# Patient Record
Sex: MALE | Race: WHITE | Employment: FULL TIME | URBAN - METROPOLITAN AREA
[De-identification: names, ages, dates, MRNs, and addresses within clinical notes are randomized per-mention and may not be internally consistent; named-entity substitution may affect disease eponyms.]

---

## 2018-09-20 ENCOUNTER — HOSPITAL ENCOUNTER (OUTPATIENT)
Dept: NUCLEAR MEDICINE | Age: 33
Discharge: HOME | End: 2018-09-20
Attending: INTERNAL MEDICINE
Payer: COMMERCIAL

## 2018-09-20 VITALS — WEIGHT: 175 LBS

## 2018-09-20 DIAGNOSIS — R10.13 EPIGASTRIC PAIN: ICD-10-CM

## 2018-09-20 DIAGNOSIS — R10.11 RIGHT UPPER QUADRANT PAIN: ICD-10-CM

## 2018-09-20 PROCEDURE — 74011000258 HC RX REV CODE- 258: Performed by: INTERNAL MEDICINE

## 2018-09-20 PROCEDURE — 78227 HEPATOBIL SYST IMAGE W/DRUG: CPT

## 2018-09-20 PROCEDURE — A9537 TC99M MEBROFENIN: HCPCS

## 2018-09-20 PROCEDURE — 74011250636 HC RX REV CODE- 250/636: Performed by: INTERNAL MEDICINE

## 2018-09-20 RX ORDER — SODIUM CHLORIDE 0.9 % (FLUSH) 0.9 %
10 SYRINGE (ML) INJECTION
Status: COMPLETED | OUTPATIENT
Start: 2018-09-20 | End: 2018-09-20

## 2018-09-20 RX ORDER — SODIUM CHLORIDE 9 MG/ML
25 INJECTION, SOLUTION INTRAVENOUS
Status: COMPLETED | OUTPATIENT
Start: 2018-09-20 | End: 2018-09-20

## 2018-09-20 RX ADMIN — SINCALIDE 1.59 MCG: 5 INJECTION, POWDER, LYOPHILIZED, FOR SOLUTION INTRAVENOUS at 12:00

## 2018-09-20 RX ADMIN — Medication 10 ML: at 10:54

## 2018-09-20 RX ADMIN — SODIUM CHLORIDE 25 ML: 9 INJECTION, SOLUTION INTRAVENOUS at 12:00

## 2021-03-04 ENCOUNTER — OFFICE VISIT (OUTPATIENT)
Dept: NEUROLOGY | Age: 36
End: 2021-03-04
Payer: COMMERCIAL

## 2021-03-04 VITALS
TEMPERATURE: 97 F | SYSTOLIC BLOOD PRESSURE: 136 MMHG | OXYGEN SATURATION: 98 % | HEART RATE: 90 BPM | DIASTOLIC BLOOD PRESSURE: 84 MMHG | BODY MASS INDEX: 27 KG/M2 | HEIGHT: 67 IN | RESPIRATION RATE: 14 BRPM | WEIGHT: 172 LBS

## 2021-03-04 DIAGNOSIS — R20.0 NUMBNESS AND TINGLING IN BOTH HANDS: ICD-10-CM

## 2021-03-04 DIAGNOSIS — R20.2 NUMBNESS AND TINGLING IN BOTH HANDS: ICD-10-CM

## 2021-03-04 DIAGNOSIS — M54.2 NECK PAIN: ICD-10-CM

## 2021-03-04 DIAGNOSIS — G43.719 INTRACTABLE CHRONIC MIGRAINE WITHOUT AURA AND WITHOUT STATUS MIGRAINOSUS: Primary | ICD-10-CM

## 2021-03-04 PROCEDURE — 99244 OFF/OP CNSLTJ NEW/EST MOD 40: CPT | Performed by: PSYCHIATRY & NEUROLOGY

## 2021-03-04 RX ORDER — HYDROGEN PEROXIDE 3 %
20 SOLUTION, NON-ORAL MISCELLANEOUS DAILY
COMMUNITY
End: 2022-03-16 | Stop reason: ALTCHOICE

## 2021-03-04 RX ORDER — RIMEGEPANT SULFATE 75 MG/75MG
TABLET, ORALLY DISINTEGRATING ORAL
Qty: 8 TAB | Refills: 3 | Status: SHIPPED | OUTPATIENT
Start: 2021-03-04 | End: 2021-06-03 | Stop reason: SDUPTHER

## 2021-03-04 RX ORDER — LANOLIN ALCOHOL/MO/W.PET/CERES
1000 CREAM (GRAM) TOPICAL DAILY
COMMUNITY

## 2021-03-04 RX ORDER — FREMANEZUMAB-VFRM 225 MG/1.5ML
1.5 INJECTION SUBCUTANEOUS
Qty: 1.5 ML | Refills: 3 | Status: SHIPPED | OUTPATIENT
Start: 2021-03-04 | End: 2021-06-03 | Stop reason: SDUPTHER

## 2021-03-04 RX ORDER — BUTALBITAL, ACETAMINOPHEN AND CAFFEINE 300; 40; 50 MG/1; MG/1; MG/1
CAPSULE ORAL
COMMUNITY
End: 2022-10-17

## 2021-03-04 RX ORDER — FREMANEZUMAB-VFRM 225 MG/1.5ML
225 INJECTION SUBCUTANEOUS ONCE
Qty: 1.5 ML | Refills: 0 | Status: SHIPPED | COMMUNITY
Start: 2021-03-04 | End: 2021-03-04

## 2021-03-04 NOTE — PROGRESS NOTES
Chief Complaint   Patient presents with    New Patient    Headache     on and off since 2018    Tingling     Was seeing neuro Dr. Georgie Diaz in 2018, tried nortriptyline, amitriptyline only gave slight improvement. >15/30 HA days. May be in front of head, back of head, behind eyes. Will get occasional tingling in arms and hands. Mainly right side. Does not lose strength. Does get nausea. Was seen at [facility] ED.  mid Feb 2021 had CT head.   Will have random eye twitching  Starting late Jan.  Eye doctor did not see anything abnl

## 2021-03-04 NOTE — PROGRESS NOTES
[facility] and [facility] at [facility] Neurology Clinics at [address] [place] [address] [place] [address] [facility] [address] [place] [address] [phone] Office  [phone] Facsimile           Referring: Ray Gonzáles MD      Chief Complaint   Patient presents with   • New Patient   • Headache     on and off since 2018   • Tingling     35-year-old right-handed gentleman presents for neurologic consultation regarding ongoing headaches and neck pain as well as hand tingling.  He tells me he began having headaches in 2018.  He was seeing Dr. Castaneda.  He tried Fioricet for abortive therapy.  Elavil and Pamelor for preventative therapy.  Did not really have much result with that.  He tried acupuncture in 2019.  Headaches are still ongoing.  In an average month he has between 10-20 averaging at least 15/month.  Average duration is 4 hours but can last up to 6 hours or more.  Fioricet will help reduce the intensity but does not take it away.  He does not have an aura.  He has associated nausea but no vomiting.  He notes that typically they are more right sided but does have neck pain as well.  Bilateral hands have numbness and tingling.  This can happen with or without a headache.  Father and uncle have sick headaches.  He also was having some twitching of his right eye.  He has seen the eye doctor and everything looked good.    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  Subjective visual disturbance in August 3, 2020 as well as the glaucomatous atrophy of the optic disc and nystagmus.    [facility] visits to 11/21 and 12/14/2020 for Covid testing both of which were not detected.  Past Medical History:   Diagnosis Date   • GERD (gastroesophageal reflux disease)    • Headache        Past  Surgical History:   Procedure Laterality Date    HX ORTHOPAEDIC  2008    both knees for tendon repairs     Current Outpatient Medications   Medication Sig Dispense Refill    esomeprazole (NEXIUM) 20 mg capsule Take 20 mg by mouth daily.  MEN'S MULTI-VITAMIN PO Take  by mouth.  fish oil-omega-3 fatty acids (Fish Oil) 340-1,000 mg capsule Take 1 Cap by mouth daily.  cyanocobalamin 1,000 mcg tablet Take 1,000 mcg by mouth daily.  butalbital-acetaminophen-caff (Fioricet) -40 mg per capsule Take  by mouth every four (4) hours as needed for Headache. Not on File Naprosyn and Pediazole noted to be allergies    Social History     Tobacco Use    Smoking status: Never Smoker    Smokeless tobacco: Never Used   Substance Use Topics    Alcohol use: Not Currently    Drug use: Not Currently       Family History   Problem Relation Age of Onset    Cancer Mother         cervical    High Cholesterol Father     Hypertension Father     Headache Father     Stroke Paternal Grandfather      Examination  Visit Vitals  /84 (BP 1 Location: Left upper arm, BP Patient Position: Sitting, BP Cuff Size: Adult)   Pulse 90   Temp 97 °F (36.1 °C)   Resp 14   Ht 5' 7\" (1.702 m)   Wt 78 kg (172 lb)   SpO2 98%   BMI 26.94 kg/m²     Neurologically, he is awake, alert, oriented with normal speech, language, and cognition. Cranial nerves intact 2-12. He has normal bulk and tone. There is no abnormal movement. There is no pronation or drift. He is able to generate full strength in the upper and lower extremities and all muscle groups to direct confrontational testing. Reflexes are symmetrical in the upper and lower extremities bilaterally. No pathologic reflexes are elicited. He has no ataxia or past pointing.   Tinel's sign present bilateral wrists right greater than left    Impression/Plan  Chronic intractable migraine headache without aura  Discussed options  Discussed migraine pathophysiology in the context of CGRP  Start Ajovy customary fashion first injection in the office today and discussed studies, expectations, side effect potential specifically injection site redness  For abortive therapy Nurtec 1 at migraine onset and also discussed potential side effects which really are no systemic side effects etc.  Track headaches on a calendar  Continue this regimen x3 months and return for decision regarding any changes in management    Numbness and tingling in the bilateral hands right greater than left with Tinel's sign likely carpal tunnel however need to exclude cervical process as well given the neck pain  EMG of the bilateral upper extremities    Follow in 3 months and if the EMG reveals something about which we need to discuss we will call    Marny Hamman, MD        This note was created using voice recognition software. Despite editing, there may be syntax errors.

## 2021-03-17 ENCOUNTER — OFFICE VISIT (OUTPATIENT)
Dept: NEUROLOGY | Age: 36
End: 2021-03-17
Payer: COMMERCIAL

## 2021-03-17 DIAGNOSIS — R20.2 NUMBNESS AND TINGLING IN BOTH HANDS: Primary | ICD-10-CM

## 2021-03-17 DIAGNOSIS — R20.0 NUMBNESS AND TINGLING IN BOTH HANDS: Primary | ICD-10-CM

## 2021-03-17 PROCEDURE — 95911 NRV CNDJ TEST 9-10 STUDIES: CPT | Performed by: PSYCHIATRY & NEUROLOGY

## 2021-03-17 PROCEDURE — 95886 MUSC TEST DONE W/N TEST COMP: CPT | Performed by: PSYCHIATRY & NEUROLOGY

## 2021-03-17 NOTE — PROCEDURES
EMG/ NCS Report  [facility] - INPATIENT  [address] [facility], [address]   Ph: [phone]   FAX: [phone]/ [phone]      Test Date:  3/17/2021    Patient: Madelin Richter : 1985 Physician: Anthony Baird MD   ID#: [id number] SEX: Male Ref. Phys: India Lopez MD   Tech: [employer]    Patient History / Exam:  Patient complaining of neck pain and intermittent bilateral hand numbness and tingling for months. Exam is non-focal. Assess for neuropathy vs cervical radiculopathy. EMG & NCV Findings:  Sensory and motor nerve conduction studies (as indicated in the tables) were within reference of normal. All left vs. right side differences were within normal limits. All F Wave latencies were within normal limits. All F Wave left vs. right side latency differences were within normal limits. Disposable concentric needle EMG (as indicated in the table) showed no evidence of electrical instability. Impressions: This study is normal.  There is no electrodiagnostic evidence of an entrapment neuropathy, generalized neuropathy, myopathy or significant cervical radiculopathy at this time. Thank you for the consult.      Seamus Temple MD    Nerve Conduction Studies  Anti Sensory Summary Table     Stim Site NR Peak (ms) Norm Peak (ms) P-T Amp (µV) Norm P-T Amp Site1 Site2 Dist (cm)   Left Median Anti Sensory (2nd Digit)  29.6°C   Wrist    3.2 <4 66.2 >13 Wrist 2nd Digit 14.0   Right Median Anti Sensory (2nd Digit)  29.8°C   Wrist    3.4 <4 20.7 >13 Wrist 2nd Digit 14.0   Elbow    3.4  29.9  Elbow Wrist 0.0   Left Radial Anti Sensory (Base 1st Digit)  29°C   Wrist    2.0 <2.8 44.5 >11 Wrist Base 1st Digit 10.0   Right Radial Anti Sensory (Base 1st Digit)  29.7°C   Wrist    1.8 <2.8 52.6 >11 Wrist Base 1st Digit 10.0   Left Ulnar Anti Sensory (5th Digit)  28.9°C   Wrist    3.6 <4.0 47.7 >9 Wrist 5th Digit 14.0   Right Ulnar Anti Sensory (5th Digit)  29.6°C   Wrist    3.1 <4.0 51.2 >9 Wrist 5th Digit 14.0     Motor Summary Table     Stim Site NR Onset (ms) Norm Onset (ms) O-P Amp (mV) Norm O-P Amp Amp (Prev) (%) Site1 Site2 Dist (cm) Peter (m/s) Norm Peter (m/s)   Left Median Motor (Abd Poll Brev)  29.1°C   Wrist    3.0 <4.5 10.7 >4.1 100.0 Wrist Abd Poll Brev 8.0     Elbow    6.2  10.1  94.4 Elbow Wrist 17.5 55 >49   Right Median Motor (Abd Poll Brev)  29.5°C   Wrist    3.4 <4.5 13.0 >4.1 100.0 Wrist Abd Poll Brev 8.0     Elbow    6.6  12.3  94.6 Elbow Wrist 18.5 58 >49   Left Ulnar Motor (Abd Dig Minimi)  29.4°C   Wrist    3.0 <3.1 15.7 >7.0 100.0 Wrist Abd Dig Minimi 8.0  >50   B Elbow    5.5  14.1  89.8 B Elbow Wrist 17.0 68 >50   A Elbow    7.1  15.1  107.1 A Elbow B Elbow 10.0 63 >50   Right Ulnar Motor (Abd Dig Minimi)  29.5°C   Wrist    3.0 <3.1 13.2 >7.0 100.0 Wrist Abd Dig Minimi 8.0  >50   B Elbow    5.5  12.6  95.5 B Elbow Wrist 13.0 52 >50   A Elbow    7.4  12.1  96.0 A Elbow B Elbow 10.0 53 >50     Comparison Summary Table     Stim Site NR Peak (ms) P-T Amp (µV) Site1 Site2 Dist (cm) Delta-0 (ms)   Left Median/Ulnar Palm Comparison (Wrist)  28.9°C   Median Palm    1.7 68.3 Median Palm Ulnar Palm 8.0 0.0   Ulnar Palm    1.8 32.5       Right Median/Ulnar Palm Comparison (Wrist)  29.5°C   Median Palm    2.2 45.7 Median Palm Ulnar Palm 8.0 0.0   Ulnar Palm    2.0 24.8         F Wave Studies     NR F-Lat (ms) Lat Norm (ms) L-R F-Lat (ms) L-R Lat Norm   Left Ulnar (Mrkrs) (Abd Dig Min)  29.3°C      22.75 <32 1.47 <2.5   Right Ulnar (Mrkrs) (Abd Dig Min)  29.6°C      24.22 <32 1.47 <2.5       EMG     Side Muscle Nerve Root Ins Act Fibs Psw Recrt Duration Amp Poly Comment   Left Deltoid Axillary C5-6 Nml Nml Nml Nml Nml Nml Nml    Left Triceps Radial C6-7-8 Nml Nml Nml Nml Nml Nml Nml    Left Biceps Musculocut C5-6 Nml Nml Nml Nml Nml Nml Nml    Left PronatorTeres Median C6-7 Nml Nml Nml Nml Nml Nml Nml    Left 1stDorInt Ulnar C8-T1 Nml Nml Nml Nml Nml Nml Nml    Left Lower Cerv Parasp Rami C7,T1 Nml Nml Nml Nml Nml Nml Nml    Left Mid Cerv Parasp Rami C5,6 Nml Nml Nml Nml Nml Nml Nml    Right Deltoid Axillary C5-6 Nml Nml Nml Nml Nml Nml Nml    Right Triceps Radial C6-7-8 Nml Nml Nml Nml Nml Nml Nml    Right Biceps Musculocut C5-6 Nml Nml Nml Nml Nml Nml Nml    Right PronatorTeres Median C6-7 Nml Nml Nml Nml Nml Nml Nml    Right 1stDorInt Ulnar C8-T1 Nml Nml Nml Nml Nml Nml Nml    Right Lower Cerv Parasp Rami C7,T1 Nml Nml Nml Nml Nml Nml Nml    Right Mid Cerv Parasp Rami C5,6 Nml Nml Nml Nml Nml Nml Nml      Waveforms:

## 2021-03-29 ENCOUNTER — TELEPHONE (OUTPATIENT)
Dept: NEUROLOGY | Age: 36
End: 2021-03-29

## 2021-03-29 NOTE — TELEPHONE ENCOUNTER
----- Message from Angela Cosby Page sent at 3/29/2021  9:34 AM EDT -----  Regarding: Dr. Kinjal Pina Message/Vendor Calls    Caller's first and last name: Patient       Reason for call: Medication interaction questions      Callback required yes/no and why: Yes.  To advise       Best contact number(s):  [phone]      Details to clarify the request:  Patient's pharmacy has advise d him to consults with Dr. Venkatesh Ramírez in regards to drug interaction of medications Ajovy, Nurtec and Chlorythimicin ( prescribed by ENT provider)      Suzanna Yu

## 2021-06-03 ENCOUNTER — OFFICE VISIT (OUTPATIENT)
Dept: NEUROLOGY | Age: 36
End: 2021-06-03
Payer: COMMERCIAL

## 2021-06-03 VITALS
DIASTOLIC BLOOD PRESSURE: 80 MMHG | HEIGHT: 67 IN | BODY MASS INDEX: 24.86 KG/M2 | SYSTOLIC BLOOD PRESSURE: 118 MMHG | OXYGEN SATURATION: 99 % | RESPIRATION RATE: 18 BRPM | HEART RATE: 111 BPM | WEIGHT: 158.4 LBS

## 2021-06-03 DIAGNOSIS — G43.719 INTRACTABLE CHRONIC MIGRAINE WITHOUT AURA AND WITHOUT STATUS MIGRAINOSUS: Primary | ICD-10-CM

## 2021-06-03 PROCEDURE — 99213 OFFICE O/P EST LOW 20 MIN: CPT | Performed by: PSYCHIATRY & NEUROLOGY

## 2021-06-03 RX ORDER — TIZANIDINE HYDROCHLORIDE 2 MG/1
2 CAPSULE, GELATIN COATED ORAL 3 TIMES DAILY
COMMUNITY

## 2021-06-03 RX ORDER — RIMEGEPANT SULFATE 75 MG/75MG
TABLET, ORALLY DISINTEGRATING ORAL
Qty: 8 TABLET | Refills: 11 | Status: SHIPPED | OUTPATIENT
Start: 2021-06-03 | End: 2022-08-22

## 2021-06-03 RX ORDER — FREMANEZUMAB-VFRM 225 MG/1.5ML
225 INJECTION SUBCUTANEOUS
Qty: 225 MG | Refills: 11 | Status: SHIPPED | OUTPATIENT
Start: 2021-06-03 | End: 2022-10-17 | Stop reason: SDUPTHER

## 2021-06-03 NOTE — PROGRESS NOTES
[address] [facility] Neurology Clinics and [address] at [facility] Neurology Clinics at [address] [facility], [address] [place] [address] [address] [facility], [address] [place] [address]   [phone]              Chief Complaint   Patient presents with    Migraine     Current Outpatient Medications   Medication Sig Dispense Refill    tiZANidine (ZANAFLEX) 2 mg capsule Take 2 mg by mouth three (3) times daily.  fremanezumab-vfrm (Ajovy Autoinjector) 225 mg/1.5 mL auto-injector 1.5 mL by SubCUTAneous route every month. 225 mg 11    rimegepant (Nurtec ODT) 75 mg disintegrating tablet 1 at migraine onset  Max 1 tab/24 hours 8 Tablet 11    esomeprazole (NEXIUM) 20 mg capsule Take 20 mg by mouth daily.  MEN'S MULTI-VITAMIN PO Take  by mouth.  cyanocobalamin 1,000 mcg tablet Take 1,000 mcg by mouth daily.  butalbital-acetaminophen-caff (Fioricet) -40 mg per capsule Take  by mouth every four (4) hours as needed for Headache. Not on File  Social History     Tobacco Use    Smoking status: Never Smoker    Smokeless tobacco: Never Used   Substance Use Topics    Alcohol use: Not Currently    Drug use: Not Currently     54-year-old gentleman returns today for follow-up intractable migraine headache. Last visit we started him on Ajovy and Nurtec. He was having some numbness and tingling in his hands and he went for an EMG which was normal.    Today he reports significant decrease in his migraines. He was having about 10/month and he had 3 or 4 in March 2 in April 1 in May and none in June thus far. He has use Nurtec x4 and got great response. Happy with how he is doing. He has some questions today regarding migraine, headaches etc. and we discussed those today good questions.     Examination  Visit Vitals  /80   Pulse (!) 111   Resp 18   Ht 5' 7\" (1.702 m)   Wt 71.8 kg (158 lb 6.4 oz)   SpO2 99%   BMI 24.81 kg/m²     Pleasant gentleman who is awake alert oriented conversant. Normal speech and lines. Normal cognition. No ataxia    Impression/Plan  Intractable migraines with good initial response to Ajovy and Nurtec. As long as he does well follow in the fall    Chavo Arceo MD        This note was created using voice recognition software. Despite editing, there may be syntax errors.

## 2021-06-03 NOTE — PROGRESS NOTES
Mr. Richard Manzano presents today to follow up migraines. Patient reported a decrease in migraines. Prior to starting Ajovy he was having approximately ten migraines per month. In March he had 3-4, in April he had 2, in May he had 1 and thus far in June, he has no migraine. Depression screening done on this patient.

## 2021-06-03 NOTE — PATIENT INSTRUCTIONS
[address] [facility] Neurology Clinic   Statement to Patients  April 1, 2014      In an effort to ensure the large volume of patient prescription refills is processed in the most efficient and expeditious manner, we are asking our patients to assist us by calling your Pharmacy for all prescription refills, this will include also your  Mail Order Pharmacy. The pharmacy will contact our office electronically to continue the refill process. Please do not wait until the last minute to call your pharmacy. We need at least 48 hours (2days) to fill prescriptions. We also encourage you to call your pharmacy before going to  your prescription to make sure it is ready. With regard to controlled substance prescription refill requests (narcotic refills) that need to be picked up at our office, we ask your cooperation by providing us with at least 72 hours (3days) notice that you will need a refill. We will not refill narcotic prescription refill requests after 4:00pm on any weekday, Monday through Thursday, or after 2:00pm on Fridays, or on the weekends. We encourage everyone to explore another way of getting your prescription refill request processed using [employer], our patient web portal through our electronic medical record system. [employer] is an efficient and effective way to communicate your medication request directly to the office and  downloadable as an bernice on your smart phone . [employer] also features a review functionality that allows you to view your medication list as well as leave messages for your physician. Are you ready to get connected? If so please review the attatched instructions or speak to any of our staff to get you set up right away! Thank you so much for your cooperation. Should you have any questions please contact our Practice Administrator.     The Physicians and Staff,  [facility]

## 2022-02-27 ENCOUNTER — TELEPHONE (OUTPATIENT)
Dept: NEUROLOGY | Age: 37
End: 2022-02-27

## 2022-02-27 NOTE — TELEPHONE ENCOUNTER
Hakeem BROWN initiated through Cover my meds key # [id number]    Approvedtoday  AZIJYU:[id number];LSEIUT:[id number]; Review Type:Prior Auth; Coverage Start Date:01/28/2022; Coverage End Date:02/27/2023

## 2022-03-16 ENCOUNTER — OFFICE VISIT (OUTPATIENT)
Dept: NEUROLOGY | Age: 37
End: 2022-03-16
Payer: COMMERCIAL

## 2022-03-16 VITALS
BODY MASS INDEX: 24.75 KG/M2 | TEMPERATURE: 99.1 F | SYSTOLIC BLOOD PRESSURE: 127 MMHG | HEART RATE: 80 BPM | RESPIRATION RATE: 16 BRPM | WEIGHT: 158 LBS | OXYGEN SATURATION: 99 % | DIASTOLIC BLOOD PRESSURE: 70 MMHG

## 2022-03-16 DIAGNOSIS — H53.9 VISUAL DISTURBANCE: Primary | ICD-10-CM

## 2022-03-16 DIAGNOSIS — R20.0 RIGHT ARM NUMBNESS: ICD-10-CM

## 2022-03-16 PROCEDURE — 99214 OFFICE O/P EST MOD 30 MIN: CPT | Performed by: PSYCHIATRY & NEUROLOGY

## 2022-03-16 NOTE — PROGRESS NOTES
[facility] Neurology Clinics and [address] at [facility] Neurology Clinics at [address] [facility], [address] [facility] [address] [facility], [address] [place] [address]   [phone]              Chief Complaint   Patient presents with    Migraine     much better, will still have \"odd sensations\" in top of head and tingling down right arm // last Ajovy was 3/6/22     Current Outpatient Medications   Medication Sig Dispense Refill    tiZANidine (ZANAFLEX) 2 mg capsule Take 2 mg by mouth three (3) times daily.  fremanezumab-vfrm (Ajovy Autoinjector) 225 mg/1.5 mL auto-injector 1.5 mL by SubCUTAneous route every month. 225 mg 11    rimegepant (Nurtec ODT) 75 mg disintegrating tablet 1 at migraine onset  Max 1 tab/24 hours 8 Tablet 11    MEN'S MULTI-VITAMIN PO Take  by mouth.  cyanocobalamin 1,000 mcg tablet Take 1,000 mcg by mouth daily.  butalbital-acetaminophen-caff (Fioricet) -40 mg per capsule Take  by mouth every four (4) hours as needed for Headache. No Known Allergies  Social History     Tobacco Use    Smoking status: Never Smoker    Smokeless tobacco: Never Used   Substance Use Topics    Alcohol use: Not Currently    Drug use: Not Currently     42-year-old gentleman returns today for follow-up. He was last seen June of last year for migraine. He has been maintained on Ajovy and Nurtec. He also has abnormal sensation in his upper extremities and had an EMG that was done by one of our neuromuscular experts and that was normal.    Today he reports migraines are better. Tolerating medicines. Having odd sensations in the top of his head tingling down to his arm. Feels weak at times. Has visual change as well.     Examination  Visit Vitals  /70 (BP 1 Location: Left upper arm, BP Patient Position: Sitting, BP Cuff Size: Adult)   Pulse 80   Temp 99.1 °F (37.3 °C)   Resp 16   Wt 71.7 kg (158 lb)   SpO2 99% BMI 24.75 kg/m²     Awake alert and oriented. Normal speech and lines were normal cognition. No cranial nerve deficit. No pronation or drift. No ataxia    Impression/Plan  Migraines doing well  Continue with Ajovy    New symptoms of sensory disturbance and visual change  MRI of the brain  Carotid Doppler    Follow-up after testing      Semaj Frost MD        This note was created using voice recognition software. Despite editing, there may be syntax errors.

## 2022-03-22 ENCOUNTER — HOSPITAL ENCOUNTER (OUTPATIENT)
Dept: MRI IMAGING | Age: 37
Discharge: HOME | End: 2022-03-22
Attending: PSYCHIATRY & NEUROLOGY
Payer: COMMERCIAL

## 2022-03-22 VITALS — BODY MASS INDEX: 26.63 KG/M2 | WEIGHT: 170 LBS

## 2022-03-22 DIAGNOSIS — H53.9 VISUAL DISTURBANCE: ICD-10-CM

## 2022-03-22 DIAGNOSIS — R20.0 RIGHT ARM NUMBNESS: ICD-10-CM

## 2022-03-22 PROCEDURE — 70553 MRI BRAIN STEM W/O & W/DYE: CPT

## 2022-03-22 PROCEDURE — 74011250636 HC RX REV CODE- 250/636: Performed by: PSYCHIATRY & NEUROLOGY

## 2022-03-22 PROCEDURE — A9576 INJ PROHANCE MULTIPACK: HCPCS | Performed by: PSYCHIATRY & NEUROLOGY

## 2022-03-22 RX ADMIN — GADOTERIDOL 15 ML: 279.3 INJECTION, SOLUTION INTRAVENOUS at 08:01

## 2022-08-22 RX ORDER — RIMEGEPANT SULFATE 75 MG/75MG
TABLET, ORALLY DISINTEGRATING ORAL
Qty: 8 TABLET | Refills: 11 | Status: SHIPPED | OUTPATIENT
Start: 2022-08-22 | End: 2022-10-17 | Stop reason: SDUPTHER

## 2022-10-17 ENCOUNTER — OFFICE VISIT (OUTPATIENT)
Dept: NEUROLOGY | Age: 37
End: 2022-10-17
Payer: COMMERCIAL

## 2022-10-17 VITALS
DIASTOLIC BLOOD PRESSURE: 75 MMHG | WEIGHT: 162.3 LBS | HEART RATE: 73 BPM | SYSTOLIC BLOOD PRESSURE: 126 MMHG | RESPIRATION RATE: 16 BRPM | BODY MASS INDEX: 25.42 KG/M2 | OXYGEN SATURATION: 99 %

## 2022-10-17 DIAGNOSIS — H53.9 VISUAL DISTURBANCE: ICD-10-CM

## 2022-10-17 DIAGNOSIS — G43.719 INTRACTABLE CHRONIC MIGRAINE WITHOUT AURA AND WITHOUT STATUS MIGRAINOSUS: Primary | ICD-10-CM

## 2022-10-17 PROCEDURE — 99214 OFFICE O/P EST MOD 30 MIN: CPT | Performed by: PSYCHIATRY & NEUROLOGY

## 2022-10-17 RX ORDER — RIMEGEPANT SULFATE 75 MG/75MG
TABLET, ORALLY DISINTEGRATING ORAL
Qty: 8 TABLET | Refills: 11 | Status: SHIPPED | OUTPATIENT
Start: 2022-10-17

## 2022-10-17 RX ORDER — FREMANEZUMAB-VFRM 225 MG/1.5ML
225 INJECTION SUBCUTANEOUS
Qty: 1.5 ML | Refills: 11 | Status: SHIPPED | OUTPATIENT
Start: 2022-10-17

## 2022-10-17 NOTE — PROGRESS NOTES
[facility] and [address] at [facility] Neurology Clinics at 42 [facility], [address] [facility] [address] [facility], [address] [place] [address]   [phone]              Chief Complaint   Patient presents with    Migraine     Doing very well with Ajovy, last dose was 10/1. Maybe slight flare up approx 2 per mo, previously was approx 4 per week. Nurtec works well to abort HA     Current Outpatient Medications   Medication Sig Dispense Refill    rimegepant (Nurtec ODT) 75 mg disintegrating tablet DISSOLVE 1 TABLET ON THE TONGUE AT ONSET OF MIGRAINE. MAX 1 TABLET/ 24 HOURS 8 Tablet 11    tiZANidine (ZANAFLEX) 2 mg capsule Take 2 mg by mouth three (3) times daily. fremanezumab-vfrm (Ajovy Autoinjector) 225 mg/1.5 mL auto-injector 1.5 mL by SubCUTAneous route every month. 225 mg 11    MEN'S MULTI-VITAMIN PO Take  by mouth.      cyanocobalamin 1,000 mcg tablet Take 1,000 mcg by mouth daily. No Known Allergies  Social History     Tobacco Use    Smoking status: Never    Smokeless tobacco: Never   Substance Use Topics    Alcohol use: Yes     Alcohol/week: 3.0 standard drinks     Types: 3 Cans of beer per week    Drug use: Never     74-year-old gentleman who returns today for follow-up intractable migraine headaches. Last visit with me was in March and at that time he was having a new issue of visual disturbance and some dysesthesia and we sent him for a couple of tests  MRI of the brain personally reviewed and unremarkable  Carotid Doppler unremarkable  Today he reports she is doing well in terms of his migraine. He tolerates Ajovy without difficulty. He was having for migraines per week and with the Ajovy he has about 2/month. Nurtec works well to abort the headache. He is really happy about that and happy with how he is doing. No injection site reactions.   He saw his MRI and Doppler results and was happy about that.  He continues to see the retina specialist at the [facility]. Examination  Visit Vitals  /75   Pulse 73   Resp 16   Wt 73.6 kg (162 lb 4.8 oz)   SpO2 99%   BMI 25.42 kg/m²   Very pleasant gentleman. He is awake alert and oriented. He has normal speech language and cognition. No ataxia      Impression/Plan  Intractable migraines markedly better with Ajovy and Nurtec  Continue this regimen    Visual disturbance with normal MRI and Doppler  Continue with the [facility] [place] [facility]    Follow-up as needed    Pawan Wilkerson MD        This note was created using voice recognition software. Despite editing, there may be syntax errors.

## 2023-02-22 ENCOUNTER — PATIENT MESSAGE (OUTPATIENT)
Dept: NEUROLOGY | Age: 38
End: 2023-02-22

## 2023-02-22 DIAGNOSIS — G43.719 INTRACTABLE CHRONIC MIGRAINE WITHOUT AURA AND WITHOUT STATUS MIGRAINOSUS: Primary | ICD-10-CM

## 2023-02-23 RX ORDER — METHYLPREDNISOLONE 4 MG/1
TABLET ORAL
Qty: 1 DOSE PACK | Refills: 0 | Status: SHIPPED | OUTPATIENT
Start: 2023-02-23 | End: 2023-02-28

## 2023-02-23 NOTE — TELEPHONE ENCOUNTER
Toñito Simpson 2/23/2023 9:05 AM EST      ----- Message -----  From: Stacy Javed  Sent: 2/22/2023 1:36 PM EST  To: [facility] Nurse Pool  Subject: Nurtec pre authorization     Good afternoon Dr Isma Rich belated new year. Trying to get my Nurtec RX refilled and it keeps getting denied despite having a valid [facility] [facility] and my same insurance Ive been using. Apparently express scripts requires a coverage review from the doctor now (fair enough I suppose as it retails for ~$900/8 tabs). The review # is [id number]. If you could help with this Id be very appreciative.     Thank you :)    Melia Carter

## 2023-02-23 NOTE — TELEPHONE ENCOUNTER
Nurtec PA initiated through Cover my meds key # -  [id number]    Approvedtoday  CaseId:[id number];Status:Approved; Review Type:Prior Auth; Coverage Start Date:01/24/2023; Coverage End Date:02/23/2024

## 2023-04-25 ENCOUNTER — OFFICE VISIT (OUTPATIENT)
Dept: NEUROLOGY | Age: 38
End: 2023-04-25
Payer: COMMERCIAL

## 2023-04-25 VITALS
HEIGHT: 67 IN | OXYGEN SATURATION: 99 % | RESPIRATION RATE: 16 BRPM | HEART RATE: 72 BPM | BODY MASS INDEX: 25.9 KG/M2 | WEIGHT: 165 LBS | DIASTOLIC BLOOD PRESSURE: 80 MMHG | SYSTOLIC BLOOD PRESSURE: 124 MMHG

## 2023-04-25 DIAGNOSIS — G43.719 INTRACTABLE CHRONIC MIGRAINE WITHOUT AURA AND WITHOUT STATUS MIGRAINOSUS: Primary | ICD-10-CM

## 2023-04-25 PROCEDURE — 99213 OFFICE O/P EST LOW 20 MIN: CPT | Performed by: PSYCHIATRY & NEUROLOGY

## 2023-04-25 RX ORDER — RIMEGEPANT SULFATE 75 MG/75MG
TABLET, ORALLY DISINTEGRATING ORAL
Qty: 8 TABLET | Refills: 11 | Status: SHIPPED | OUTPATIENT
Start: 2023-04-25

## 2023-04-25 RX ORDER — HYDROGEN PEROXIDE 3 %
20 SOLUTION, NON-ORAL MISCELLANEOUS DAILY
COMMUNITY

## 2023-04-25 RX ORDER — FREMANEZUMAB-VFRM 225 MG/1.5ML
225 INJECTION SUBCUTANEOUS
Qty: 1.5 ML | Refills: 11 | Status: SHIPPED | OUTPATIENT
Start: 2023-04-25

## 2023-04-25 NOTE — PROGRESS NOTES
[facility] Neurology Clinics and [address] at [facility] Neurology Clinics at [address] [facility], [address] [facility] [address] [facility], [address] [place] [address]   [phone]              Chief Complaint   Patient presents with    Follow-up     Migraines     Current Outpatient Medications   Medication Sig Dispense Refill    esomeprazole (NexIUM) 20 mg capsule Take 1 Capsule by mouth daily. rimegepant (Nurtec ODT) 75 mg disintegrating tablet DISSOLVE 1 TABLET ON THE TONGUE AT ONSET OF MIGRAINE. MAX 1 TABLET/ 24 HOURS 8 Tablet 11    fremanezumab-vfrm (Ajovy Autoinjector) 225 mg/1.5 mL auto-injector 1.5 mL by SubCUTAneous route every month. 1.5 mL 11    tiZANidine (ZANAFLEX) 2 mg capsule Take 1 Capsule by mouth three (3) times daily. MEN'S MULTI-VITAMIN PO Take  by mouth.      cyanocobalamin 1,000 mcg tablet Take 1 Tablet by mouth daily. No Known Allergies  Social History     Tobacco Use    Smoking status: Never    Smokeless tobacco: Never   Substance Use Topics    Alcohol use: Yes     Alcohol/week: 3.0 standard drinks     Types: 3 Cans of beer per week    Drug use: Never     25-year-old gentleman returns today for follow-up intractable migraine headaches. His last visit with me was in October and he was doing well on Ajovy and Nurtec. Today he reports may be twice a month he gets a migraine starting to come on and he will take a Nurtec and it either markedly decreases or goes away. He is happy with how is doing. Tolerates meds without difficulty. Examination  Visit Vitals  Resp 16   Ht 5' 7\" (1.702 m)   Wt 74.8 kg (165 lb)   BMI 25.84 kg/m²     Awake, alert and oriented. No icterus. CN intact 2-12 without nystagmus. No pronation or drift. Resists fully in all 4 extrems. DTR symmetric in all 4 extremities. No ataxia. Steady gait.       Impression/Plan  Intractable migraine headaches doing well  Continue Ajovy  Continue Nurtec  Follow-up 1 year, sooner if needed    Karime Juan MD        This note was created using voice recognition software. Despite editing, there may be syntax errors.

## 2023-04-25 NOTE — PROGRESS NOTES
Chief Complaint   Patient presents with    Follow-up     Migraines     Visit Vitals  /80   Pulse 72   Resp 16   Ht 5' 7\" (1.702 m)   Wt 74.8 kg (165 lb)   SpO2 99%   BMI 25.84 kg/m²

## 2023-05-09 ENCOUNTER — TELEPHONE (OUTPATIENT)
Age: 38
End: 2023-05-09

## 2023-05-09 DIAGNOSIS — G43.719 CHRONIC MIGRAINE WITHOUT AURA, INTRACTABLE, WITHOUT STATUS MIGRAINOSUS: Primary | ICD-10-CM

## 2023-05-09 RX ORDER — FREMANEZUMAB-VFRM 225 MG/1.5ML
225 INJECTION SUBCUTANEOUS
Qty: 1.5 ML | Refills: 11 | Status: SHIPPED | OUTPATIENT
Start: 2023-05-09

## 2023-05-09 NOTE — TELEPHONE ENCOUNTER
Ajovy syringe An active PA is already on file with expiration date of 05/01/2024.  Please wait to resubmit request within 60 days of that expiration date to obtain a PA renewa

## 2024-03-22 ENCOUNTER — TELEPHONE (OUTPATIENT)
Age: 39
End: 2024-03-22

## 2024-03-22 DIAGNOSIS — G43.719 CHRONIC MIGRAINE WITHOUT AURA, INTRACTABLE, WITHOUT STATUS MIGRAINOSUS: Primary | ICD-10-CM

## 2024-03-22 RX ORDER — RIMEGEPANT SULFATE 75 MG/75MG
TABLET, ORALLY DISINTEGRATING ORAL
Qty: 8 TABLET | Refills: 1 | Status: SHIPPED | OUTPATIENT
Start: 2024-03-22 | End: 2024-04-30 | Stop reason: SDUPTHER

## 2024-03-22 NOTE — TELEPHONE ENCOUNTER
----- Message from Baldemarkaylen Joneston sent at 3/22/2024  1:39 PM EDT -----  Regarding: Nurtec pre authorization   Contact: [phone]  I don’t believe there was anything special in play; Dr Taylor got me set up with the ajovy/nurtec combo in ‘20/‘21 and my insurance has always covered it- sometimes it requires the pre authorization from your office but has always been fine previously unless something has changed for ‘24…

## 2024-03-24 ENCOUNTER — TELEPHONE (OUTPATIENT)
Age: 39
End: 2024-03-24

## 2024-04-30 ENCOUNTER — TELEPHONE (OUTPATIENT)
Age: 39
End: 2024-04-30

## 2024-04-30 ENCOUNTER — OFFICE VISIT (OUTPATIENT)
Age: 39
End: 2024-04-30
Payer: COMMERCIAL

## 2024-04-30 VITALS
BODY MASS INDEX: 25.9 KG/M2 | SYSTOLIC BLOOD PRESSURE: 119 MMHG | DIASTOLIC BLOOD PRESSURE: 71 MMHG | RESPIRATION RATE: 14 BRPM | WEIGHT: 165 LBS | OXYGEN SATURATION: 98 % | HEART RATE: 78 BPM | HEIGHT: 67 IN

## 2024-04-30 DIAGNOSIS — G43.719 CHRONIC MIGRAINE WITHOUT AURA, INTRACTABLE, WITHOUT STATUS MIGRAINOSUS: ICD-10-CM

## 2024-04-30 PROCEDURE — 99214 OFFICE O/P EST MOD 30 MIN: CPT | Performed by: PSYCHIATRY & NEUROLOGY

## 2024-04-30 RX ORDER — ATORVASTATIN CALCIUM 10 MG/1
10 TABLET, FILM COATED ORAL DAILY
COMMUNITY

## 2024-04-30 RX ORDER — CHLORAL HYDRATE 500 MG
CAPSULE ORAL
COMMUNITY

## 2024-04-30 RX ORDER — RIMEGEPANT SULFATE 75 MG/75MG
TABLET, ORALLY DISINTEGRATING ORAL
Qty: 8 TABLET | Refills: 1 | Status: SHIPPED | OUTPATIENT
Start: 2024-04-30

## 2024-04-30 RX ORDER — FREMANEZUMAB-VFRM 225 MG/1.5ML
225 INJECTION SUBCUTANEOUS
Qty: 1.5 ML | Refills: 11 | Status: SHIPPED | OUTPATIENT
Start: 2024-04-30

## 2024-04-30 ASSESSMENT — PATIENT HEALTH QUESTIONNAIRE - PHQ9
SUM OF ALL RESPONSES TO PHQ9 QUESTIONS 1 & 2: 0
SUM OF ALL RESPONSES TO PHQ QUESTIONS 1-9: 0
2. FEELING DOWN, DEPRESSED OR HOPELESS: NOT AT ALL
SUM OF ALL RESPONSES TO PHQ QUESTIONS 1-9: 0
SUM OF ALL RESPONSES TO PHQ QUESTIONS 1-9: 0
1. LITTLE INTEREST OR PLEASURE IN DOING THINGS: NOT AT ALL
SUM OF ALL RESPONSES TO PHQ QUESTIONS 1-9: 0

## 2024-04-30 NOTE — PROGRESS NOTES
unremarkable  CBC unremarkable  Urinalysis unremarkable      Examination  /71 (Site: Left Upper Arm, Position: Sitting)   Pulse 78   Resp 14   Ht 1.702 m (5' 7\")   Wt 74.8 kg (165 lb)   SpO2 98%   BMI 25.84 kg/m²   Awake, alert and oriented.  No icterus.  CN intact 2-12 without nystagmus.  No pronation or drift. Resists fully in all 4 extrems.  DTR symmetric.  No ataxia. Steady gait.      Impression/Plan  Intractable migraine doing well on the above combination  Continue Ajovy  Continue Nurtec  Follow-up 1 year    Conchita Taylor MD        This note was created using voice recognition software. Despite editing, there may be syntax errors.

## 2024-04-30 NOTE — TELEPHONE ENCOUNTER
Re: Yuniel Kasper PA in [facility] but advised no PA predicted, normally PA is needed, created case in [facility] Key# [id number], approval rcemile. Auth# [id number] effective 03/31/24-04/30/25

## 2024-12-06 DIAGNOSIS — G43.719 CHRONIC MIGRAINE WITHOUT AURA, INTRACTABLE, WITHOUT STATUS MIGRAINOSUS: ICD-10-CM

## 2024-12-06 RX ORDER — RIMEGEPANT SULFATE 75 MG/75MG
TABLET, ORALLY DISINTEGRATING ORAL
Qty: 8 TABLET | Refills: 1 | Status: SHIPPED | OUTPATIENT
Start: 2024-12-06

## 2025-02-13 ENCOUNTER — TELEPHONE (OUTPATIENT)
Age: 40
End: 2025-02-13

## 2025-02-13 NOTE — TELEPHONE ENCOUNTER
Called patient no answer. Left message stating that I was calling to reschedule the appointment on 4/29/2025 due to Dr. Taylor being out of office.

## 2025-05-05 DIAGNOSIS — G43.719 CHRONIC MIGRAINE WITHOUT AURA, INTRACTABLE, WITHOUT STATUS MIGRAINOSUS: ICD-10-CM

## 2025-05-05 RX ORDER — FREMANEZUMAB-VFRM 225 MG/1.5ML
INJECTION SUBCUTANEOUS
Qty: 1.5 ML | Refills: 4 | Status: SHIPPED | OUTPATIENT
Start: 2025-05-05

## 2025-05-13 ENCOUNTER — OFFICE VISIT (OUTPATIENT)
Age: 40
End: 2025-05-13
Payer: COMMERCIAL

## 2025-05-13 DIAGNOSIS — G43.719 CHRONIC MIGRAINE WITHOUT AURA, INTRACTABLE, WITHOUT STATUS MIGRAINOSUS: ICD-10-CM

## 2025-05-13 DIAGNOSIS — M25.511 RIGHT SHOULDER PAIN, UNSPECIFIED CHRONICITY: Primary | ICD-10-CM

## 2025-05-13 PROCEDURE — 99214 OFFICE O/P EST MOD 30 MIN: CPT | Performed by: PSYCHIATRY & NEUROLOGY

## 2025-05-13 RX ORDER — FREMANEZUMAB-VFRM 225 MG/1.5ML
INJECTION SUBCUTANEOUS
Qty: 1.5 ML | Refills: 11 | Status: SHIPPED | OUTPATIENT
Start: 2025-05-13

## 2025-05-13 RX ORDER — RIMEGEPANT SULFATE 75 MG/75MG
TABLET, ORALLY DISINTEGRATING ORAL
Qty: 8 TABLET | Refills: 11 | Status: SHIPPED | OUTPATIENT
Start: 2025-05-13

## 2025-05-13 NOTE — PROGRESS NOTES
[facility] at [facility] at [address] [place] [address] [address] [facility] [address] [place] [address] [phone]              No chief complaint on file.    Current Outpatient Medications   Medication Sig Dispense Refill    Fremanezumab-vfrm (AJOVY) 225 MG/1.5ML SOAJ ADMINISTER 225MG UNDER THE SKIN EVERY 30 DAYS 1.5 mL 11    rimegepant sulfate (NURTEC) 75 MG TBDP DISSOLVE 1 TABLET ON THE TONGUE AT ONSET OF MIGRAINE. MAX 1 TABLET/ 24 HOURS 8 tablet 11    Multiple Vitamins-Minerals (MENS MULTI VITAMIN & MINERAL PO) Take by mouth      Omega-3 Fatty Acids (FISH OIL) 1000 MG capsule       atorvastatin (LIPITOR) 10 MG tablet Take 1 tablet by mouth daily      esomeprazole (NEXIUM) 20 MG delayed release capsule Take 1 capsule by mouth daily      cyanocobalamin 1000 MCG tablet Take 1 tablet by mouth daily      tiZANidine (ZANAFLEX) 2 MG capsule Take 1 capsule by mouth as needed       No current facility-administered medications for this visit.      Allergies   Allergen Reactions    Nortriptyline Hallucinations    Nsaids Nausea And Vomiting    Sulfa Antibiotics Nausea And Vomiting and Nausea Only     Social History     Tobacco Use    Smoking status: Never    Smokeless tobacco: Never   Substance Use Topics    Alcohol use: Yes     Alcohol/week: 3.0 standard drinks of alcohol    Drug use: Never       History of Present Illness  39-year-old gentleman following up today for intractable migraine headaches.  He has been on the combination of Ajovy and Nurtec.  Migraines are few and far between.  Ajovy is effective.  Nurtec takes the migraine away within 1 hour.  He is happy with how is doing.    He also has some right shoulder/upper extremity pain.  We did an EMG a few years ago that was unremarkable.  It bothers him from time to time.  He has been thinking about physical

## 2025-05-27 DIAGNOSIS — G43.719 CHRONIC MIGRAINE WITHOUT AURA, INTRACTABLE, WITHOUT STATUS MIGRAINOSUS: ICD-10-CM

## 2025-05-27 RX ORDER — FREMANEZUMAB-VFRM 225 MG/1.5ML
INJECTION SUBCUTANEOUS
Qty: 1.5 ML | Refills: 11 | Status: ACTIVE | OUTPATIENT
Start: 2025-05-27

## 2025-05-27 RX ORDER — RIMEGEPANT SULFATE 75 MG/75MG
TABLET, ORALLY DISINTEGRATING ORAL
Qty: 8 TABLET | Refills: 11 | Status: ACTIVE | OUTPATIENT
Start: 2025-05-27

## 2025-05-29 ENCOUNTER — APPOINTMENT (OUTPATIENT)
Facility: HOSPITAL | Age: 40
End: 2025-05-29
Payer: COMMERCIAL

## 2025-05-29 ENCOUNTER — HOSPITAL ENCOUNTER (EMERGENCY)
Facility: HOSPITAL | Age: 40
Discharge: HOME | End: 2025-05-29
Attending: EMERGENCY MEDICINE
Payer: COMMERCIAL

## 2025-05-29 VITALS
RESPIRATION RATE: 18 BRPM | DIASTOLIC BLOOD PRESSURE: 86 MMHG | HEART RATE: 84 BPM | TEMPERATURE: 97.2 F | OXYGEN SATURATION: 97 % | SYSTOLIC BLOOD PRESSURE: 131 MMHG

## 2025-05-29 DIAGNOSIS — K40.90 LEFT INGUINAL HERNIA: ICD-10-CM

## 2025-05-29 DIAGNOSIS — R10.9 LEFT FLANK PAIN: Primary | ICD-10-CM

## 2025-05-29 LAB
ALBUMIN SERPL-MCNC: 4.4 G/DL (ref 3.5–5)
ALBUMIN/GLOB SERPL: 1.1 (ref 1.1–2.2)
ALP SERPL-CCNC: 115 U/L (ref 45–117)
ALT SERPL-CCNC: 30 U/L (ref 12–78)
ANION GAP SERPL CALC-SCNC: 12 MMOL/L (ref 2–12)
APPEARANCE UR: CLEAR
AST SERPL-CCNC: 19 U/L (ref 15–37)
BACTERIA URNS QL MICRO: NEGATIVE /HPF
BASOPHILS # BLD: 0.06 K/UL (ref 0–0.1)
BASOPHILS NFR BLD: 0.5 % (ref 0–1)
BILIRUB SERPL-MCNC: 0.6 MG/DL (ref 0.2–1)
BILIRUB UR QL: NEGATIVE
BUN SERPL-MCNC: 13 MG/DL (ref 6–20)
BUN/CREAT SERPL: 13 (ref 12–20)
CALCIUM SERPL-MCNC: 9.4 MG/DL (ref 8.5–10.1)
CHLORIDE SERPL-SCNC: 100 MMOL/L (ref 97–108)
CO2 SERPL-SCNC: 28 MMOL/L (ref 21–32)
COLOR UR: NORMAL
CREAT SERPL-MCNC: 1.01 MG/DL (ref 0.7–1.3)
DIFFERENTIAL METHOD BLD: NORMAL
EOSINOPHIL # BLD: 0.08 K/UL (ref 0–0.4)
EOSINOPHIL NFR BLD: 0.7 % (ref 0–7)
EPITH CASTS URNS QL MICRO: NORMAL /LPF
ERYTHROCYTE [DISTWIDTH] IN BLOOD BY AUTOMATED COUNT: 12.6 % (ref 11.5–14.5)
GLOBULIN SER CALC-MCNC: 4.1 G/DL (ref 2–4)
GLUCOSE SERPL-MCNC: 105 MG/DL (ref 65–100)
GLUCOSE UR STRIP.AUTO-MCNC: NEGATIVE MG/DL
HCT VFR BLD AUTO: 46.8 % (ref 36.6–50.3)
HGB BLD-MCNC: 15.9 G/DL (ref 12.1–17)
HGB UR QL STRIP: NEGATIVE
IMM GRANULOCYTES # BLD AUTO: 0.03 K/UL (ref 0–0.04)
IMM GRANULOCYTES NFR BLD AUTO: 0.3 % (ref 0–0.5)
KETONES UR QL STRIP.AUTO: NEGATIVE MG/DL
LEUKOCYTE ESTERASE UR QL STRIP.AUTO: NEGATIVE
LYMPHOCYTES # BLD: 2.8 K/UL (ref 0.8–3.5)
LYMPHOCYTES NFR BLD: 25.3 % (ref 12–49)
MCH RBC QN AUTO: 28.7 PG (ref 26–34)
MCHC RBC AUTO-ENTMCNC: 34 G/DL (ref 30–36.5)
MCV RBC AUTO: 84.5 FL (ref 80–99)
MONOCYTES # BLD: 0.9 K/UL (ref 0–1)
MONOCYTES NFR BLD: 8.1 % (ref 5–13)
NEUTS SEG # BLD: 7.21 K/UL (ref 1.8–8)
NEUTS SEG NFR BLD: 65.1 % (ref 32–75)
NITRITE UR QL STRIP.AUTO: NEGATIVE
NRBC # BLD: 0 K/UL (ref 0–0.01)
NRBC BLD-RTO: 0 PER 100 WBC
PH UR STRIP: 6 (ref 5–8)
PLATELET # BLD AUTO: 269 K/UL (ref 150–400)
PMV BLD AUTO: 10.2 FL (ref 8.9–12.9)
POTASSIUM SERPL-SCNC: 3.8 MMOL/L (ref 3.5–5.1)
PROT SERPL-MCNC: 8.5 G/DL (ref 6.4–8.2)
PROT UR STRIP-MCNC: NEGATIVE MG/DL
RBC # BLD AUTO: 5.54 M/UL (ref 4.1–5.7)
RBC #/AREA URNS HPF: NORMAL /HPF (ref 0–5)
SODIUM SERPL-SCNC: 140 MMOL/L (ref 136–145)
SP GR UR REFRACTOMETRY: 1.01 (ref 1–1.03)
URINE CULTURE IF INDICATED: NORMAL
UROBILINOGEN UR QL STRIP.AUTO: 0.2 EU/DL (ref 0.2–1)
WBC # BLD AUTO: 11.1 K/UL (ref 4.1–11.1)
WBC URNS QL MICRO: NORMAL /HPF (ref 0–4)

## 2025-05-29 PROCEDURE — 99284 EMERGENCY DEPT VISIT MOD MDM: CPT

## 2025-05-29 PROCEDURE — 74176 CT ABD & PELVIS W/O CONTRAST: CPT

## 2025-05-29 PROCEDURE — 36415 COLL VENOUS BLD VENIPUNCTURE: CPT

## 2025-05-29 PROCEDURE — 80053 COMPREHEN METABOLIC PANEL: CPT

## 2025-05-29 PROCEDURE — 85025 COMPLETE CBC W/AUTO DIFF WBC: CPT

## 2025-05-29 PROCEDURE — 76870 US EXAM SCROTUM: CPT

## 2025-05-29 PROCEDURE — 81001 URINALYSIS AUTO W/SCOPE: CPT

## 2025-05-29 ASSESSMENT — PAIN DESCRIPTION - FREQUENCY: FREQUENCY: INTERMITTENT

## 2025-05-29 ASSESSMENT — PAIN DESCRIPTION - LOCATION: LOCATION: FLANK;GROIN

## 2025-05-29 ASSESSMENT — PAIN DESCRIPTION - ORIENTATION: ORIENTATION: LEFT;LOWER

## 2025-05-29 ASSESSMENT — PAIN SCALES - GENERAL: PAINLEVEL_OUTOF10: 6

## 2025-05-29 ASSESSMENT — PAIN DESCRIPTION - DESCRIPTORS: DESCRIPTORS: SORE

## 2025-05-29 NOTE — ED PROVIDER NOTES
[facility] EMERGENCY DEPARTMENT  EMERGENCY DEPARTMENT ENCOUNTER      Pt Name: Baldemar Castillo  MRN: [id number]  Birthdate 1985  Date of evaluation: 5/29/2025  Provider: Nikki Garcia MD    CHIEF COMPLAINT       Chief Complaint   Patient presents with    Flank Pain    Testicle Pain         HISTORY OF PRESENT ILLNESS   (Location/Symptom, Timing/Onset, Context/Setting, Quality, Duration, Modifying Factors, Severity)  Note limiting factors.   Patient is a 39-year-old who comes into the emergency department with left-sided flank pain that radiates down into his groin.  The symptoms started approximately 1 week ago and have not been associated with dysuria, hematuria, urinary frequency, fevers, chills, nausea, vomiting, or diarrhea.  He has no history of kidney stones and denies similar symptoms in the past.  He lifted a heavy grill earlier this week but that was after his pain started.  He comes in today because the pain in his groin became more intense with sharp shooting pains in his left testicle radiating into his abdomen.    The history is provided by the patient.         Review of External Medical Records:     Nursing Notes were reviewed.    REVIEW OF SYSTEMS    (2-9 systems for level 4, 10 or more for level 5)     Review of Systems    Except as noted above the remainder of the review of systems was reviewed and negative.       PAST MEDICAL HISTORY     Past Medical History:   Diagnosis Date    GERD (gastroesophageal reflux disease)     Headache     Hearing reduced          SURGICAL HISTORY       Past Surgical History:   Procedure Laterality Date    ORTHOPEDIC SURGERY  2008    both knees for tendon repairs         CURRENT MEDICATIONS       Discharge Medication List as of 5/29/2025  7:38 PM        CONTINUE these medications which have NOT CHANGED    Details   Fremanezumab-vfrm (AJOVY) 225 MG/1.5ML SOAJ ADMINISTER 225MG UNDER THE SKIN EVERY 30 DAYS, Disp-1.5 mL, R-11Normal      rimegepant sulfate (NURTEC) 75 MG  Physician / Physician Assistant / Nurse Practitioner              Nikki Garcia MD  05/29/25 1954

## 2025-05-29 NOTE — ED NOTES
Condition Stable  Patient discharged to home  Patient education was completed  Education taught to patient  Teaching method used was handout and verbal  Understanding of teaching was good  Patient was discharged ambulatory  Discharged with self  Valuables were given to patient remained in possession of belongings during stay

## 2025-05-29 NOTE — ED TRIAGE NOTES
Patient arrives with cc of LLQ, L flank pain, and L testicle pain starting 1 wk ago. Patient reports he did lift a grill, denies other heavy lifting. Denies urinary symptoms. Reports hx of testicle pain in the past, was seen by urology and told it may be a varices. Reports nausea. Denies vomiting. Denies hx of kidney stones. Arrives ambulatory, A & Ox 4, and pov. Denies hx of epididymitis.

## 2025-06-04 ENCOUNTER — OFFICE VISIT (OUTPATIENT)
Age: 40
End: 2025-06-04
Payer: COMMERCIAL

## 2025-06-04 VITALS
OXYGEN SATURATION: 99 % | HEIGHT: 67 IN | HEART RATE: 77 BPM | TEMPERATURE: 98.2 F | WEIGHT: 168.4 LBS | DIASTOLIC BLOOD PRESSURE: 76 MMHG | BODY MASS INDEX: 26.43 KG/M2 | SYSTOLIC BLOOD PRESSURE: 114 MMHG | RESPIRATION RATE: 17 BRPM

## 2025-06-04 DIAGNOSIS — K40.90 LEFT INGUINAL HERNIA: Primary | ICD-10-CM

## 2025-06-04 PROCEDURE — 99203 OFFICE O/P NEW LOW 30 MIN: CPT | Performed by: SURGERY

## 2025-06-04 NOTE — PROGRESS NOTES
Identified patient with two patient identifiers (name and ). Reviewed chart in preparation for visit and have obtained necessary documentation.    Baldemar Castillo is a 39 y.o. male  Chief Complaint   Patient presents with    New Patient    Inguinal Hernia     /76 (BP Site: Left Upper Arm, Patient Position: Sitting, BP Cuff Size: Large Adult)   Pulse 77   Temp 98.2 °F (36.8 °C) (Oral)   Resp 17   Ht 1.702 m (5' 7\")   Wt 76.4 kg (168 lb 6.4 oz)   SpO2 99%   BMI 26.38 kg/m²     1. Have you been to the ER, urgent care clinic since your last visit?  Hospitalized since your last visit? Yes [facility]PT ED     2. Have you seen or consulted any other health care providers outside of the [facility] since your last visit?  Include any pap smears or colon screening. no

## 2025-06-04 NOTE — PROGRESS NOTES
Surgery History and Physical    Subjective:      Baldemar Castillo  is a 39 y.o.   male who presents with a left inguinal hernia discovered on an US done to evaluate right groin pain, thought originally to be due to some scrotal pathology..    Past Medical History:   Diagnosis Date    GERD (gastroesophageal reflux disease)     Headache     Hearing loss     Hearing reduced     Left inguinal hernia 06/04/2025    Migraine        Past Surgical History:   Procedure Laterality Date    KNEE SURGERY  2008    Both knees- tendon procedure    ORTHOPEDIC SURGERY  2008    both knees for tendon repairs       Social History     Tobacco Use    Smoking status: Never    Smokeless tobacco: Never   Substance Use Topics    Alcohol use: Yes     Alcohol/week: 4.0 standard drinks of alcohol     Types: 4 Cans of beer per week       Family History   Problem Relation Age of Onset    Cancer Mother         cervical    Headache Father     Hypertension Father     High Cholesterol Father     Stroke Paternal Grandfather     Diabetes Maternal Grandfather     Stroke Maternal Grandfather     Alzheimer's Disease Maternal Grandfather        Current Outpatient Medications on File Prior to Visit   Medication Sig Dispense Refill    Fremanezumab-vfrm (AJOVY) 225 MG/1.5ML SOAJ ADMINISTER 225MG UNDER THE SKIN EVERY 30 DAYS 1.5 mL 11    rimegepant sulfate (NURTEC) 75 MG TBDP DISSOLVE 1 TABLET ON THE TONGUE AT ONSET OF MIGRAINE. MAX 1 TABLET/ 24 HOURS 8 tablet 11    Multiple Vitamins-Minerals (MENS MULTI VITAMIN & MINERAL PO) Take by mouth      Omega-3 Fatty Acids (FISH OIL) 1000 MG capsule       esomeprazole (NEXIUM) 20 MG delayed release capsule Take 1 capsule by mouth daily      cyanocobalamin 1000 MCG tablet Take 1 tablet by mouth daily      tiZANidine (ZANAFLEX) 2 MG capsule Take 1 capsule by mouth as needed       No current facility-administered medications on file prior to visit.       Allergies   Allergen Reactions    Nortriptyline Hallucinations

## 2025-07-01 ENCOUNTER — TELEPHONE (OUTPATIENT)
Age: 40
End: 2025-07-01

## 2025-07-01 NOTE — TELEPHONE ENCOUNTER
Now has similar pain in the right side without a bulge.Will see him back in the office to evaluate.

## 2025-07-02 ENCOUNTER — OFFICE VISIT (OUTPATIENT)
Age: 40
End: 2025-07-02
Payer: COMMERCIAL

## 2025-07-02 VITALS
HEART RATE: 72 BPM | RESPIRATION RATE: 15 BRPM | BODY MASS INDEX: 26.34 KG/M2 | OXYGEN SATURATION: 99 % | DIASTOLIC BLOOD PRESSURE: 80 MMHG | HEIGHT: 67 IN | WEIGHT: 167.8 LBS | SYSTOLIC BLOOD PRESSURE: 132 MMHG | TEMPERATURE: 98.2 F

## 2025-07-02 DIAGNOSIS — R10.31 RIGHT INGUINAL PAIN: Primary | ICD-10-CM

## 2025-07-02 PROCEDURE — 99212 OFFICE O/P EST SF 10 MIN: CPT | Performed by: SURGERY

## 2025-07-02 RX ORDER — ALPRAZOLAM 0.25 MG
0.25 TABLET ORAL PRN
COMMUNITY

## 2025-07-02 ASSESSMENT — PATIENT HEALTH QUESTIONNAIRE - PHQ9
SUM OF ALL RESPONSES TO PHQ QUESTIONS 1-9: 0
1. LITTLE INTEREST OR PLEASURE IN DOING THINGS: NOT AT ALL
2. FEELING DOWN, DEPRESSED OR HOPELESS: NOT AT ALL
SUM OF ALL RESPONSES TO PHQ QUESTIONS 1-9: 0

## 2025-07-02 NOTE — PROGRESS NOTES
Surgery History and Physical    Subjective:      Baldemar Castillo  is a 39 y.o.   male who presents with pain in his right groin. He has a known left inguinal hernia which he is deciding about repair or watchful waiting.  He was on a hiking trip and began to experience pain in the right inguinal region, somewhat higher, along the anterior iliac spine. Concerned about a possible hernia on the right side now..    Past Medical History:   Diagnosis Date    GERD (gastroesophageal reflux disease)     Headache     Hearing loss     Hearing reduced     Left inguinal hernia 06/04/2025    Migraine     Right inguinal pain 07/02/2025       Past Surgical History:   Procedure Laterality Date    KNEE SURGERY  2008    Both knees- tendon procedure    ORTHOPEDIC SURGERY  2008    both knees for tendon repairs       Social History     Tobacco Use    Smoking status: Never    Smokeless tobacco: Never   Substance Use Topics    Alcohol use: Yes     Alcohol/week: 4.0 standard drinks of alcohol     Types: 4 Cans of beer per week       Family History   Problem Relation Age of Onset    Cancer Mother         cervical    Headache Father     Hypertension Father     High Cholesterol Father     Stroke Paternal Grandfather     Diabetes Maternal Grandfather     Stroke Maternal Grandfather     Alzheimer's Disease Maternal Grandfather        Current Outpatient Medications on File Prior to Visit   Medication Sig Dispense Refill    ALPRAZolam (XANAX) 0.25 MG tablet Take 1 tablet by mouth as needed for Sleep.      Fremanezumab-vfrm (AJOVY) 225 MG/1.5ML SOAJ ADMINISTER 225MG UNDER THE SKIN EVERY 30 DAYS 1.5 mL 11    rimegepant sulfate (NURTEC) 75 MG TBDP DISSOLVE 1 TABLET ON THE TONGUE AT ONSET OF MIGRAINE. MAX 1 TABLET/ 24 HOURS 8 tablet 11    Multiple Vitamins-Minerals (MENS MULTI VITAMIN & MINERAL PO) Take by mouth      Omega-3 Fatty Acids (FISH OIL) 1000 MG capsule       esomeprazole (NEXIUM) 20 MG delayed release capsule Take 1 capsule by mouth

## 2025-07-02 NOTE — PROGRESS NOTES
Identified patient with two patient identifiers (name and ). Reviewed chart in preparation for visit and have obtained necessary documentation.    Baldemar Castillo is a 39 y.o. male  Chief Complaint   Patient presents with    Follow-up    Groin Pain     /80   Pulse 72   Temp 98.2 °F (36.8 °C) (Oral)   Resp 15   Ht 1.702 m (5' 7\")   Wt 76.1 kg (167 lb 12.8 oz)   SpO2 99%   BMI 26.28 kg/m²     1. Have you been to the ER, urgent care clinic since your last visit?  Hospitalized since your last visit?no    2. Have you seen or consulted any other health care providers outside of the [facility] since your last visit?  Include any pap smears or colon screening. no

## 2025-07-17 ENCOUNTER — TELEPHONE (OUTPATIENT)
Age: 40
End: 2025-07-17

## 2025-07-17 NOTE — TELEPHONE ENCOUNTER
Patient called and states the hernia is starting to cause him to be uncomfortable so he would like to have it repaired. Patient wondering what the next steps to scheduling the procedure would be.

## 2025-07-21 ENCOUNTER — TELEPHONE (OUTPATIENT)
Age: 40
End: 2025-07-21

## 2025-07-21 NOTE — TELEPHONE ENCOUNTER
Returned call to patient.  Two patient identifiers used. 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 I made patient aware he may be on restrictions for a couple weeks when returning to work. Patient verbalized understanding and stated that is extremely helpful and thanked for call.

## 2025-07-21 NOTE — TELEPHONE ENCOUNTER
Contacted patient to schedule surgery with Dr. Woodward. Patient stated that he would like to know what the recovery period would look like so he can plan around work. I told patient I will send a message to Dr. Woodward's nurse and they will return the call. Patient thanked me.

## 2025-07-22 NOTE — TELEPHONE ENCOUNTER
Patient called back and requested to schedule surgery with Dr. Woodward on 7/31. Notified patient of arrival time and stated I will send a surgical letter via [facility] and home address. Patient thanked me for the call.    none

## 2025-07-24 ENCOUNTER — HOSPITAL ENCOUNTER (OUTPATIENT)
Facility: HOSPITAL | Age: 40
Discharge: HOME | End: 2025-07-27
Payer: COMMERCIAL

## 2025-07-24 VITALS
WEIGHT: 170 LBS | HEIGHT: 67 IN | HEART RATE: 76 BPM | DIASTOLIC BLOOD PRESSURE: 76 MMHG | SYSTOLIC BLOOD PRESSURE: 117 MMHG | TEMPERATURE: 97.9 F | RESPIRATION RATE: 18 BRPM | BODY MASS INDEX: 26.68 KG/M2

## 2025-07-24 LAB
BASOPHILS # BLD: 0.05 K/UL (ref 0–0.1)
BASOPHILS NFR BLD: 0.7 % (ref 0–1)
DIFFERENTIAL METHOD BLD: NORMAL
EOSINOPHIL # BLD: 0.04 K/UL (ref 0–0.4)
EOSINOPHIL NFR BLD: 0.5 % (ref 0–7)
ERYTHROCYTE [DISTWIDTH] IN BLOOD BY AUTOMATED COUNT: 12.3 % (ref 11.5–14.5)
HCT VFR BLD AUTO: 47.2 % (ref 36.6–50.3)
HGB BLD-MCNC: 15.7 G/DL (ref 12.1–17)
IMM GRANULOCYTES # BLD AUTO: 0.01 K/UL (ref 0–0.04)
IMM GRANULOCYTES NFR BLD AUTO: 0.1 % (ref 0–0.5)
LYMPHOCYTES # BLD: 2.41 K/UL (ref 0.8–3.5)
LYMPHOCYTES NFR BLD: 31.5 % (ref 12–49)
MCH RBC QN AUTO: 29.1 PG (ref 26–34)
MCHC RBC AUTO-ENTMCNC: 33.3 G/DL (ref 30–36.5)
MCV RBC AUTO: 87.4 FL (ref 80–99)
MONOCYTES # BLD: 0.62 K/UL (ref 0–1)
MONOCYTES NFR BLD: 8.1 % (ref 5–13)
NEUTS SEG # BLD: 4.51 K/UL (ref 1.8–8)
NEUTS SEG NFR BLD: 59.1 % (ref 32–75)
NRBC # BLD: 0 K/UL (ref 0–0.01)
NRBC BLD-RTO: 0 PER 100 WBC
PLATELET # BLD AUTO: 280 K/UL (ref 150–400)
PMV BLD AUTO: 10.6 FL (ref 8.9–12.9)
RBC # BLD AUTO: 5.4 M/UL (ref 4.1–5.7)
WBC # BLD AUTO: 7.6 K/UL (ref 4.1–11.1)

## 2025-07-24 PROCEDURE — 85025 COMPLETE CBC W/AUTO DIFF WBC: CPT

## 2025-07-24 ASSESSMENT — PAIN DESCRIPTION - PAIN TYPE: TYPE: ACUTE PAIN

## 2025-07-24 ASSESSMENT — PAIN DESCRIPTION - LOCATION: LOCATION: ABDOMEN

## 2025-07-24 ASSESSMENT — PAIN DESCRIPTION - FREQUENCY: FREQUENCY: CONTINUOUS

## 2025-07-24 ASSESSMENT — PAIN - FUNCTIONAL ASSESSMENT: PAIN_FUNCTIONAL_ASSESSMENT: PREVENTS OR INTERFERES SOME ACTIVE ACTIVITIES AND ADLS

## 2025-07-24 ASSESSMENT — PAIN SCALES - GENERAL: PAINLEVEL_OUTOF10: 3

## 2025-07-24 ASSESSMENT — PAIN DESCRIPTION - ORIENTATION: ORIENTATION: LOWER;LEFT

## 2025-07-24 ASSESSMENT — PAIN DESCRIPTION - DESCRIPTORS: DESCRIPTORS: SHARP;DULL;THROBBING

## 2025-07-24 NOTE — PERIOP NOTE
[address] [address]      MAIN PRE OP            [phone]                                                                                AMBULATORY PRE OP          [phone]    PRE-ADMISSION TESTING    [phone]     Surgery Date:  7/31/25        [facility] staff will call you between 4 and 7pm the day before your surgery with your arrival time.   (If your surgery is on a Monday, we will call you the Friday before.)    Call [phone] after 7pm Monday-Friday if you did not receive this call.    INSTRUCTIONS BEFORE YOUR SURGERY   When You  Arrive Arrive at [facility] Patient Access on 1st floor the day of your surgery.    Have your insurance card, photo ID,living will/advanced directive/POA (if applicable),  and any copayment (if needed)   Food   and   Drink NO solid food after midnight the night before surgery. You can drink clear liquids from midnight until ONE hour prior to your arrival at the hospital on the day of your surgery.     Clear liquids include:  Water  Apple juice (no sediment)  Carbonated beverages  Black coffee(no cream/milk)  Tea(no cream/milk)  Gatorade    No alcohol (beer, wine, liquor) or marijuana (smoking) 24 hours prior to surgery.   No marijuana edibles for 3 days prior to surgery.    Stop smoking cigarettes 14 days before surgery (helps w/healing and breathing).   Medications to   TAKE   Morning of Surgery MEDICATIONS TO TAKE THE MORNING OF SURGERY: NEXIUM    You may take these medications, IF NEEDED, the morning of surgery: XANAX    Ask your surgeon/prescribing doctor for instructions on taking or stopping these medications prior to surgery: NONE   Medications to STOP  before surgery Non-Steroidal anti-inflammatory Drugs (NSAID's): for example, Diclofenac (Voltaren), Ibuprofen (Advil, Motrin), Naproxen (Aleve) PT DOES NOT TAKE NSAIDS    STOP all herbal supplements and vitamins(unless prescribed by your doctor),

## 2025-07-31 ENCOUNTER — HOSPITAL ENCOUNTER (OUTPATIENT)
Facility: HOSPITAL | Age: 40
Setting detail: OUTPATIENT SURGERY
Discharge: HOME | End: 2025-07-31
Attending: SURGERY | Admitting: SURGERY
Payer: COMMERCIAL

## 2025-07-31 ENCOUNTER — ANESTHESIA EVENT (OUTPATIENT)
Facility: HOSPITAL | Age: 40
End: 2025-07-31
Payer: COMMERCIAL

## 2025-07-31 ENCOUNTER — ANESTHESIA (OUTPATIENT)
Facility: HOSPITAL | Age: 40
End: 2025-07-31
Payer: COMMERCIAL

## 2025-07-31 VITALS
SYSTOLIC BLOOD PRESSURE: 125 MMHG | TEMPERATURE: 98 F | DIASTOLIC BLOOD PRESSURE: 82 MMHG | RESPIRATION RATE: 16 BRPM | BODY MASS INDEX: 26.63 KG/M2 | WEIGHT: 170 LBS | HEART RATE: 87 BPM | OXYGEN SATURATION: 99 %

## 2025-07-31 DIAGNOSIS — G89.18 POST-OP PAIN: Primary | ICD-10-CM

## 2025-07-31 PROCEDURE — 3700000001 HC ADD 15 MINUTES (ANESTHESIA): Performed by: SURGERY

## 2025-07-31 PROCEDURE — 2500000003 HC RX 250 WO HCPCS: Performed by: SURGERY

## 2025-07-31 PROCEDURE — 6360000002 HC RX W HCPCS

## 2025-07-31 PROCEDURE — 2580000003 HC RX 258: Performed by: ANESTHESIOLOGY

## 2025-07-31 PROCEDURE — 6370000000 HC RX 637 (ALT 250 FOR IP): Performed by: ANESTHESIOLOGY

## 2025-07-31 PROCEDURE — 6360000002 HC RX W HCPCS: Performed by: SURGERY

## 2025-07-31 PROCEDURE — 3600000002 HC SURGERY LEVEL 2 BASE: Performed by: SURGERY

## 2025-07-31 PROCEDURE — 3600000012 HC SURGERY LEVEL 2 ADDTL 15MIN: Performed by: SURGERY

## 2025-07-31 PROCEDURE — 2580000003 HC RX 258: Performed by: SURGERY

## 2025-07-31 PROCEDURE — 88302 TISSUE EXAM BY PATHOLOGIST: CPT

## 2025-07-31 PROCEDURE — 7100000011 HC PHASE II RECOVERY - ADDTL 15 MIN: Performed by: SURGERY

## 2025-07-31 PROCEDURE — 2580000003 HC RX 258

## 2025-07-31 PROCEDURE — 7100000000 HC PACU RECOVERY - FIRST 15 MIN: Performed by: SURGERY

## 2025-07-31 PROCEDURE — 3700000000 HC ANESTHESIA ATTENDED CARE: Performed by: SURGERY

## 2025-07-31 PROCEDURE — 2709999900 HC NON-CHARGEABLE SUPPLY: Performed by: SURGERY

## 2025-07-31 PROCEDURE — C1781 MESH (IMPLANTABLE): HCPCS | Performed by: SURGERY

## 2025-07-31 PROCEDURE — 7100000010 HC PHASE II RECOVERY - FIRST 15 MIN: Performed by: SURGERY

## 2025-07-31 PROCEDURE — 7100000001 HC PACU RECOVERY - ADDTL 15 MIN: Performed by: SURGERY

## 2025-07-31 PROCEDURE — 2500000003 HC RX 250 WO HCPCS

## 2025-07-31 DEVICE — PERFIX PLUG, 1.3" X 1.55" (3.3 CM X 3.9 CM), MEDIUM (CONTENTS: 2)
Type: IMPLANTABLE DEVICE | Site: GROIN | Status: FUNCTIONAL
Brand: PERFIX

## 2025-07-31 RX ORDER — SODIUM CHLORIDE 0.9 % (FLUSH) 0.9 %
5-40 SYRINGE (ML) INJECTION PRN
Status: DISCONTINUED | OUTPATIENT
Start: 2025-07-31 | End: 2025-07-31 | Stop reason: HOSPADM

## 2025-07-31 RX ORDER — ACETAMINOPHEN 500 MG
1000 TABLET ORAL ONCE
Status: DISCONTINUED | OUTPATIENT
Start: 2025-07-31 | End: 2025-07-31 | Stop reason: SDUPTHER

## 2025-07-31 RX ORDER — FENTANYL CITRATE 50 UG/ML
INJECTION, SOLUTION INTRAMUSCULAR; INTRAVENOUS
Status: DISCONTINUED | OUTPATIENT
Start: 2025-07-31 | End: 2025-07-31 | Stop reason: SDUPTHER

## 2025-07-31 RX ORDER — HYDROMORPHONE HYDROCHLORIDE 1 MG/ML
0.5 INJECTION, SOLUTION INTRAMUSCULAR; INTRAVENOUS; SUBCUTANEOUS EVERY 5 MIN PRN
Status: DISCONTINUED | OUTPATIENT
Start: 2025-07-31 | End: 2025-07-31 | Stop reason: HOSPADM

## 2025-07-31 RX ORDER — ACETAMINOPHEN 500 MG
1000 TABLET ORAL ONCE
Status: COMPLETED | OUTPATIENT
Start: 2025-07-31 | End: 2025-07-31

## 2025-07-31 RX ORDER — SODIUM CHLORIDE 0.9 % (FLUSH) 0.9 %
5-40 SYRINGE (ML) INJECTION EVERY 12 HOURS SCHEDULED
Status: DISCONTINUED | OUTPATIENT
Start: 2025-07-31 | End: 2025-07-31 | Stop reason: HOSPADM

## 2025-07-31 RX ORDER — SODIUM CHLORIDE 9 MG/ML
INJECTION, SOLUTION INTRAVENOUS PRN
Status: DISCONTINUED | OUTPATIENT
Start: 2025-07-31 | End: 2025-07-31 | Stop reason: HOSPADM

## 2025-07-31 RX ORDER — SUCCINYLCHOLINE/SOD CL,ISO/PF 200MG/10ML
SYRINGE (ML) INTRAVENOUS
Status: DISCONTINUED | OUTPATIENT
Start: 2025-07-31 | End: 2025-07-31 | Stop reason: SDUPTHER

## 2025-07-31 RX ORDER — LIDOCAINE HYDROCHLORIDE 10 MG/ML
1 INJECTION, SOLUTION EPIDURAL; INFILTRATION; INTRACAUDAL; PERINEURAL
Status: DISCONTINUED | OUTPATIENT
Start: 2025-07-31 | End: 2025-07-31 | Stop reason: HOSPADM

## 2025-07-31 RX ORDER — FENTANYL CITRATE 50 UG/ML
25 INJECTION, SOLUTION INTRAMUSCULAR; INTRAVENOUS EVERY 5 MIN PRN
Status: DISCONTINUED | OUTPATIENT
Start: 2025-07-31 | End: 2025-07-31 | Stop reason: HOSPADM

## 2025-07-31 RX ORDER — PROPOFOL 10 MG/ML
INJECTION, EMULSION INTRAVENOUS
Status: DISCONTINUED | OUTPATIENT
Start: 2025-07-31 | End: 2025-07-31 | Stop reason: SDUPTHER

## 2025-07-31 RX ORDER — LIDOCAINE HYDROCHLORIDE 20 MG/ML
INJECTION, SOLUTION EPIDURAL; INFILTRATION; INTRACAUDAL; PERINEURAL
Status: DISCONTINUED | OUTPATIENT
Start: 2025-07-31 | End: 2025-07-31 | Stop reason: SDUPTHER

## 2025-07-31 RX ORDER — MIDAZOLAM HYDROCHLORIDE 2 MG/2ML
2 INJECTION, SOLUTION INTRAMUSCULAR; INTRAVENOUS PRN
Status: DISCONTINUED | OUTPATIENT
Start: 2025-07-31 | End: 2025-07-31 | Stop reason: HOSPADM

## 2025-07-31 RX ORDER — SODIUM CHLORIDE, SODIUM LACTATE, POTASSIUM CHLORIDE, CALCIUM CHLORIDE 600; 310; 30; 20 MG/100ML; MG/100ML; MG/100ML; MG/100ML
INJECTION, SOLUTION INTRAVENOUS
Status: DISCONTINUED | OUTPATIENT
Start: 2025-07-31 | End: 2025-07-31 | Stop reason: SDUPTHER

## 2025-07-31 RX ORDER — MIDAZOLAM HYDROCHLORIDE 1 MG/ML
INJECTION, SOLUTION INTRAMUSCULAR; INTRAVENOUS
Status: DISCONTINUED | OUTPATIENT
Start: 2025-07-31 | End: 2025-07-31 | Stop reason: SDUPTHER

## 2025-07-31 RX ORDER — PROCHLORPERAZINE EDISYLATE 5 MG/ML
5 INJECTION INTRAMUSCULAR; INTRAVENOUS
Status: DISCONTINUED | OUTPATIENT
Start: 2025-07-31 | End: 2025-07-31 | Stop reason: HOSPADM

## 2025-07-31 RX ORDER — OXYCODONE HYDROCHLORIDE 5 MG/1
5 TABLET ORAL
Status: COMPLETED | OUTPATIENT
Start: 2025-07-31 | End: 2025-07-31

## 2025-07-31 RX ORDER — BUPIVACAINE HCL/EPINEPHRINE 0.5-1:200K
VIAL (ML) INJECTION PRN
Status: DISCONTINUED | OUTPATIENT
Start: 2025-07-31 | End: 2025-07-31 | Stop reason: HOSPADM

## 2025-07-31 RX ORDER — ONDANSETRON 2 MG/ML
INJECTION INTRAMUSCULAR; INTRAVENOUS
Status: DISCONTINUED | OUTPATIENT
Start: 2025-07-31 | End: 2025-07-31 | Stop reason: SDUPTHER

## 2025-07-31 RX ORDER — KETOROLAC TROMETHAMINE 30 MG/ML
INJECTION, SOLUTION INTRAMUSCULAR; INTRAVENOUS
Status: DISCONTINUED | OUTPATIENT
Start: 2025-07-31 | End: 2025-07-31 | Stop reason: SDUPTHER

## 2025-07-31 RX ORDER — ONDANSETRON 2 MG/ML
4 INJECTION INTRAMUSCULAR; INTRAVENOUS
Status: DISCONTINUED | OUTPATIENT
Start: 2025-07-31 | End: 2025-07-31 | Stop reason: HOSPADM

## 2025-07-31 RX ORDER — FENTANYL CITRATE 50 UG/ML
100 INJECTION, SOLUTION INTRAMUSCULAR; INTRAVENOUS
Status: DISCONTINUED | OUTPATIENT
Start: 2025-07-31 | End: 2025-07-31 | Stop reason: HOSPADM

## 2025-07-31 RX ORDER — HYDRALAZINE HYDROCHLORIDE 20 MG/ML
10 INJECTION INTRAMUSCULAR; INTRAVENOUS
Status: DISCONTINUED | OUTPATIENT
Start: 2025-07-31 | End: 2025-07-31 | Stop reason: HOSPADM

## 2025-07-31 RX ORDER — ROCURONIUM BROMIDE 10 MG/ML
INJECTION, SOLUTION INTRAVENOUS
Status: DISCONTINUED | OUTPATIENT
Start: 2025-07-31 | End: 2025-07-31 | Stop reason: SDUPTHER

## 2025-07-31 RX ORDER — OXYCODONE AND ACETAMINOPHEN 5; 325 MG/1; MG/1
1 TABLET ORAL EVERY 6 HOURS PRN
Qty: 20 TABLET | Refills: 0 | Status: SHIPPED | OUTPATIENT
Start: 2025-07-31 | End: 2025-08-05

## 2025-07-31 RX ORDER — SODIUM CHLORIDE, SODIUM LACTATE, POTASSIUM CHLORIDE, CALCIUM CHLORIDE 600; 310; 30; 20 MG/100ML; MG/100ML; MG/100ML; MG/100ML
INJECTION, SOLUTION INTRAVENOUS CONTINUOUS
Status: DISCONTINUED | OUTPATIENT
Start: 2025-07-31 | End: 2025-07-31 | Stop reason: HOSPADM

## 2025-07-31 RX ORDER — HYDROMORPHONE HYDROCHLORIDE 1 MG/ML
INJECTION, SOLUTION INTRAMUSCULAR; INTRAVENOUS; SUBCUTANEOUS
Status: DISCONTINUED | OUTPATIENT
Start: 2025-07-31 | End: 2025-07-31 | Stop reason: SDUPTHER

## 2025-07-31 RX ORDER — DEXAMETHASONE SODIUM PHOSPHATE 4 MG/ML
INJECTION, SOLUTION INTRA-ARTICULAR; INTRALESIONAL; INTRAMUSCULAR; INTRAVENOUS; SOFT TISSUE
Status: DISCONTINUED | OUTPATIENT
Start: 2025-07-31 | End: 2025-07-31 | Stop reason: SDUPTHER

## 2025-07-31 RX ADMIN — ACETAMINOPHEN 1000 MG: 500 TABLET ORAL at 09:18

## 2025-07-31 RX ADMIN — SODIUM CHLORIDE, POTASSIUM CHLORIDE, SODIUM LACTATE AND CALCIUM CHLORIDE: 600; 310; 30; 20 INJECTION, SOLUTION INTRAVENOUS at 09:36

## 2025-07-31 RX ADMIN — MIDAZOLAM 2 MG: 1 INJECTION INTRAMUSCULAR; INTRAVENOUS at 09:34

## 2025-07-31 RX ADMIN — WATER 2000 MG: 1 INJECTION INTRAMUSCULAR; INTRAVENOUS; SUBCUTANEOUS at 09:51

## 2025-07-31 RX ADMIN — LIDOCAINE HYDROCHLORIDE 80 MG: 20 INJECTION, SOLUTION EPIDURAL; INFILTRATION; INTRACAUDAL; PERINEURAL at 09:38

## 2025-07-31 RX ADMIN — FENTANYL CITRATE 50 MCG: 50 INJECTION INTRAMUSCULAR; INTRAVENOUS at 09:38

## 2025-07-31 RX ADMIN — FENTANYL CITRATE 50 MCG: 50 INJECTION INTRAMUSCULAR; INTRAVENOUS at 09:44

## 2025-07-31 RX ADMIN — OXYCODONE HYDROCHLORIDE 5 MG: 5 TABLET ORAL at 12:11

## 2025-07-31 RX ADMIN — HYDROMORPHONE HYDROCHLORIDE 1 MG: 1 INJECTION, SOLUTION INTRAMUSCULAR; INTRAVENOUS; SUBCUTANEOUS at 11:13

## 2025-07-31 RX ADMIN — PROPOFOL 250 MG: 10 INJECTION, EMULSION INTRAVENOUS at 09:41

## 2025-07-31 RX ADMIN — DEXAMETHASONE SODIUM PHOSPHATE 4 MG: 4 INJECTION INTRA-ARTICULAR; INTRALESIONAL; INTRAMUSCULAR; INTRAVENOUS; SOFT TISSUE at 10:00

## 2025-07-31 RX ADMIN — SODIUM CHLORIDE, SODIUM LACTATE, POTASSIUM CHLORIDE, AND CALCIUM CHLORIDE: .6; .31; .03; .02 INJECTION, SOLUTION INTRAVENOUS at 09:14

## 2025-07-31 RX ADMIN — KETOROLAC TROMETHAMINE 15 MG: 30 INJECTION, SOLUTION INTRAMUSCULAR at 10:53

## 2025-07-31 RX ADMIN — ROCURONIUM BROMIDE 5 MG: 10 INJECTION, SOLUTION INTRAVENOUS at 09:41

## 2025-07-31 RX ADMIN — Medication 160 MG: at 09:42

## 2025-07-31 RX ADMIN — ONDANSETRON 4 MG: 2 INJECTION, SOLUTION INTRAMUSCULAR; INTRAVENOUS at 10:00

## 2025-07-31 ASSESSMENT — PAIN DESCRIPTION - PAIN TYPE
TYPE: SURGICAL PAIN
TYPE: SURGICAL PAIN

## 2025-07-31 ASSESSMENT — PAIN DESCRIPTION - DESCRIPTORS
DESCRIPTORS: SORE
DESCRIPTORS: ACHING

## 2025-07-31 ASSESSMENT — PAIN DESCRIPTION - LOCATION
LOCATION: GROIN
LOCATION: ABDOMEN

## 2025-07-31 ASSESSMENT — PAIN - FUNCTIONAL ASSESSMENT
PAIN_FUNCTIONAL_ASSESSMENT: 0-10
PAIN_FUNCTIONAL_ASSESSMENT: 0-10

## 2025-07-31 ASSESSMENT — PAIN DESCRIPTION - ORIENTATION
ORIENTATION: LEFT
ORIENTATION: LEFT

## 2025-07-31 ASSESSMENT — PAIN SCALES - GENERAL
PAINLEVEL_OUTOF10: 5
PAINLEVEL_OUTOF10: 3
PAINLEVEL_OUTOF10: 5

## 2025-07-31 NOTE — OP NOTE
[address]  [address]                            OPERATIVE REPORT      PATIENT NAME: ARLENE CARDENAS                : 1985  MED REC NO: [id number]                       ROOM: OR  ACCOUNT NO: [id number]                       ADMIT DATE: 2025  PROVIDER: Zeke Woodward MD    DATE OF SERVICE:  2025    PREOPERATIVE DIAGNOSES:  Left inguinal hernia.    POSTOPERATIVE DIAGNOSES:  Left inguinal hernia.    PROCEDURES PERFORMED:  Left inguinal hernia repair with plug and patch.    SURGEON:  Zeke Woodward MD    ASSISTANT:  Leo Lynch MD    ANESTHESIA:  General, supplemented with Exparel.    ESTIMATED BLOOD LOSS:  Minimal.    SPECIMENS REMOVED:  A small left inguinal hernia sac.    INTRAOPERATIVE FINDINGS:  There was no infection present and also there was a super small indirect hernia in a very lax direct site.     COMPLICATIONS:  None.    IMPLANTS:  Medium plug and patch.    INDICATIONS:  Hernia.    DESCRIPTION OF PROCEDURE:  With the patient supine and suitably anesthetized, the abdomen was prepared with ChloraPrep and draped as a field.  0.5% Marcaine with epinephrine was infiltrated in the skin and an oblique incision was made and carried down through the skin and subcutaneous tissues to the fascia of the external oblique, which was opened in the direction of its fibers.  The contents of the cord were encircled.  The dissection revealed a very small indirect inguinal hernia sac, which was amputated.  Medially, the floor was quite lax, but there was no specific defect in the transversalis fascia.  A medium plug was placed to reproduce the internal ring and a patch was fashioned and secured to the pubic tubercle, shelving edge of Poupart's ligament, and superiorly to the conjoint tendon.  The tabs of the patch were approximated lateral to the cord and then the external oblique was closed from lateral to medial with 0

## 2025-07-31 NOTE — ANESTHESIA POSTPROCEDURE EVALUATION
Department of Anesthesiology  Postprocedure Note    Patient: Baldemar Castillo  MRN: [id number]  YOB: 1985  Date of evaluation: 7/31/2025    Procedure Summary       Date: 07/31/25 Room / Location: [facility] MAIN OR [address] [place] [address] MAIN OR    Anesthesia Start: 0936 Anesthesia Stop: 1115    Procedure: LEFT INGUINAL HERNIA REPAIR (Left: Groin) Diagnosis:       Left inguinal hernia      (Left inguinal hernia [K40.90])    Providers: Zeke Woodward MD Responsible Provider: Albina Chambers DO    Anesthesia Type: General ASA Status: 2            Anesthesia Type: General    Sherri Phase I: Sherri Score: 10    Sherri Phase II:      Anesthesia Post Evaluation    Patient location during evaluation: PACU  Level of consciousness: awake  Airway patency: patent  Nausea & Vomiting: no nausea  Cardiovascular status: hemodynamically stable  Respiratory status: acceptable  Hydration status: stable  Multimodal analgesia pain management approach  Pain management: adequate    No notable events documented.

## 2025-07-31 NOTE — ANESTHESIA PRE PROCEDURE
Department of Anesthesiology  Preprocedure Note       Name:  Baldemar Castillo   Age:  39 y.o.  :  1985                                          MRN:  [id number]         Date:  2025      Surgeon: Surgeon(s):  Zeke Woodward MD    Procedure: Procedure(s):  LEFT INGUINAL HERNIA REPAIR    Medications prior to admission:   Prior to Admission medications    Medication Sig Start Date End Date Taking? Authorizing Provider   ALPRAZolam (XANAX) 0.25 MG tablet Take 1 tablet by mouth as needed for Anxiety.   Yes Marques Silverman MD   esomeprazole (NEXIUM) 20 MG delayed release capsule Take 1 capsule by mouth daily   Yes Marques Silverman MD   FLAXSEED, LINSEED, PO Take by mouth    ProviderMarques MD   Fremanezumab-vfrm (AJOVY) 225 MG/1.5ML SOAJ ADMINISTER 225MG UNDER THE SKIN EVERY 30 DAYS  Patient taking differently: ADMINISTER 225MG UNDER THE SKIN EVERY 30 DAYS  LAST ON 25   Conchita Taylor MD   rimegepant sulfate (NURTEC) 75 MG TBDP DISSOLVE 1 TABLET ON THE TONGUE AT ONSET OF MIGRAINE. MAX 1 TABLET/ 24 HOURS 25   Conchita Taylor MD   Multiple Vitamins-Minerals (MENS MULTI VITAMIN & MINERAL PO) Take by mouth    Marques Silverman MD   cyanocobalamin 1000 MCG tablet Take 1 tablet by mouth daily    Automatic Reconciliation, Ar   tiZANidine (ZANAFLEX) 2 MG capsule Take 1 capsule by mouth as needed    Automatic Reconciliation, Ar       Current medications:    Current Facility-Administered Medications   Medication Dose Route Frequency Provider Last Rate Last Admin    lidocaine PF 1 % injection 1 mL  1 mL IntraDERmal Once PRN Albina Chambers, DO        acetaminophen (TYLENOL) tablet 1,000 mg  1,000 mg Oral Once Albina Chambers I, DO        fentaNYL (SUBLIMAZE) injection 100 mcg  100 mcg IntraVENous Once PRN Albina Chambers, DO        lactated ringers infusion   IntraVENous Continuous Albina Chambers, DO        sodium chloride flush 0.9 % injection 5-40 mL  5-40 mL IntraVENous

## 2025-07-31 NOTE — DISCHARGE INSTRUCTIONS
suggest fiber, a stool softener, or a mild laxative.   Medicines    Be safe with medicines. Read and follow all instructions on the label.  If the doctor gave you a prescription medicine for pain, take it as prescribed.  If you are not taking a prescription pain medicine, ask your doctor if you can take an over-the-counter medicine.     Your doctor will tell you if and when you can restart your medicines. He or she will also give you instructions about taking any new medicines.   Incision care    You will have a dressing over the cut (incision). A dressing helps the cut heal and protects it. Your doctor will tell you how to take care of this.     If you have skin adhesive on the cut, leave it on until it falls off. Skin adhesive is also called liquid stitches or glue.     If you have strips of tape on the cut, leave the tape on for a week or until it falls off.     If you had stitches, your doctor will tell you when to come back to have them removed.     Wash the area daily with warm, soapy water, and pat it dry. Don't use hydrogen peroxide or alcohol. They can slow healing.   Ice    Put ice or a cold pack on the area for 10 to 20 minutes at a time. Try to do this every 1 to 2 hours for the next 3 days (when you are awake) or until the swelling goes down. Put a thin cloth between the ice and your skin.   Follow-up care is a key part of your treatment and safety. Be sure to make and go to all appointments, and call your doctor if you are having problems. It's also a good idea to know your test results and keep a list of the medicines you take.  When should you call for help?   Call 911 anytime you think you may need emergency care. For example, call if:    You passed out (lost consciousness).     You are short of breath.   Call your doctor now or seek immediate medical care if:    You have pain that does not get better after you take pain medicine.     You have loose stitches, or your incision comes open.     Bright

## 2025-07-31 NOTE — BRIEF OP NOTE
Brief Postoperative Note      Patient: Baldemar Castillo  YOB: 1985  MRN: [id number]    Date of Procedure: 7/31/2025    Pre-Op Diagnosis Codes:      * Left inguinal hernia [K40.90]    Post-Op Diagnosis: Same       Procedure(s):  LEFT INGUINAL HERNIA REPAIR    Surgeon(s):  Zeke Woodward MD    Assistant:  Resident: Leo Lynch MD    Anesthesia: General    Estimated Blood Loss (mL): Minimal    Complications: None    Specimens:   ID Type Source Tests Collected by Time Destination   1 : LEFT INGUINAL HERNIA Tissue Groin SURGICAL PATHOLOGY Zeke Woodward MD 7/31/2025 1023        Implants:  Implant Name Type Inv. Item Serial No.  Lot No. LRB No. Used Action   PLUG SIMIN M W1.3XL1.55IN INGUINAL POLYPR REP PRESHAPED - SNA  PLUG SIMIN M W1.3XL1.55IN INGUINAL POLYPR REP PRESHAPED NA BARD DAVOL-WD [id number] Left 1 Implanted         Drains: * No LDAs found *    Findings:  Present At Time Of Surgery (PATOS) (choose all levels that have infection present):  No infection present  Other Findings: small indirect and lax direct site    Electronically signed by Zeke Woodward MD on 7/31/2025 at 10:55 AM    [id number]

## 2025-07-31 NOTE — INTERVAL H&P NOTE
Update History & Physical    The patient's History and Physical of July 2, 2025 was reviewed with the patient and I examined the patient. There was no change. The surgical site was confirmed by the patient and me.     Plan: The risks, benefits, expected outcome, and alternative to the recommended procedure have been discussed with the patient. Patient understands and wants to proceed with the procedure.     Electronically signed by Zeke Woodward MD on 7/31/2025 at 7:37 AM

## 2025-08-13 ENCOUNTER — OFFICE VISIT (OUTPATIENT)
Age: 40
End: 2025-08-13

## 2025-08-13 VITALS
TEMPERATURE: 97.6 F | HEART RATE: 86 BPM | BODY MASS INDEX: 25.93 KG/M2 | SYSTOLIC BLOOD PRESSURE: 109 MMHG | HEIGHT: 67 IN | WEIGHT: 165.2 LBS | DIASTOLIC BLOOD PRESSURE: 76 MMHG | OXYGEN SATURATION: 98 % | RESPIRATION RATE: 19 BRPM

## 2025-08-13 DIAGNOSIS — Z48.89 POSTOPERATIVE VISIT: Primary | ICD-10-CM

## 2025-08-13 PROBLEM — K40.90 LEFT INGUINAL HERNIA: Status: RESOLVED | Noted: 2025-06-04 | Resolved: 2025-08-13

## 2025-08-13 PROCEDURE — 99024 POSTOP FOLLOW-UP VISIT: CPT | Performed by: SURGERY

## (undated) DEVICE — BASIN ST MAJOR-NO CAUTERY: Brand: MEDLINE INDUSTRIES, INC.

## (undated) DEVICE — APPLICATOR MEDICATED 26 CC SOLUTION HI LT ORNG CHLORAPREP

## (undated) DEVICE — PENCIL SMK EVAC 10 FT BLADE ELECTRD ROCKER FOR TELSCP

## (undated) DEVICE — SUTURE VICRYL SZ 2-0 L27IN ABSRB UD L26MM CT-2 1/2 CIR J269H

## (undated) DEVICE — HYPODERMIC SAFETY NEEDLE: Brand: MONOJECT

## (undated) DEVICE — SYRINGE MED 10ML LUERLOCK TIP W/O SFTY DISP

## (undated) DEVICE — X-RAY DETECTABLE SPONGES,16 PLY: Brand: VISTEC

## (undated) DEVICE — PACK,LAPAROTOMY,2 REINFORCED GOWNS: Brand: MEDLINE

## (undated) DEVICE — HYPODERMIC SAFETY NEEDLE: Brand: MAGELLAN

## (undated) DEVICE — SUTURE VICRYL + SZ 3-0 L27IN ABSRB UD L26MM SH 1/2 CIR VCP416H

## (undated) DEVICE — SUTURE MONOCRYL + SZ 4-0 L27IN ABSRB UD L19MM PS-2 3/8 CIR MCP426H

## (undated) DEVICE — INTENT OT USE PROVIDES A STERILE INTERFACE BETWEEN THE OPERATING ROOM SURGICAL LAMPS (NON-STERILE) AND THE SURGEON OR STAFF WORKING IN THE STERILE FIELD.: Brand: ASPEN® ALC PLUS LIGHT HANDLE COVER

## (undated) DEVICE — SOLUTION IRRIG 1000ML 09% SOD CHL USP PIC PLAS CONTAINER

## (undated) DEVICE — SUTURE VICRYL + SZ 2-0 L27IN ABSRB WHT SH 1/2 CIR TAPERCUT VCP417H

## (undated) DEVICE — GLOVE ORANGE PI 7 1/2   MSG9075

## (undated) DEVICE — ELECTRODE PT RET AD L9FT HI MOIST COND ADH HYDRGEL CORDED

## (undated) DEVICE — GARMENT,MEDLINE,DVT,INT,CALF,MED, GEN2: Brand: MEDLINE

## (undated) DEVICE — SUTURE VICRYL + SZ 0 L27IN ABSRB WHT CT-2 1/2 CIR TAPERPOINT VCP270H

## (undated) DEVICE — ADHESIVE SKIN CLSR 0.7ML TOP DERMBND ADV

## (undated) DEVICE — DRAPE,UTILTY,TAPE,15X26, 4EA/PK: Brand: MEDLINE